# Patient Record
Sex: FEMALE | Race: WHITE | NOT HISPANIC OR LATINO
[De-identification: names, ages, dates, MRNs, and addresses within clinical notes are randomized per-mention and may not be internally consistent; named-entity substitution may affect disease eponyms.]

---

## 2018-09-20 ENCOUNTER — CHARTING TRANS (OUTPATIENT)
Dept: OTHER | Age: 53
End: 2018-09-20

## 2018-09-20 ASSESSMENT — PAIN SCALES - GENERAL: PAINLEVEL_OUTOF10: 4

## 2018-12-08 VITALS
OXYGEN SATURATION: 97 % | WEIGHT: 150 LBS | TEMPERATURE: 98.4 F | HEART RATE: 78 BPM | DIASTOLIC BLOOD PRESSURE: 70 MMHG | HEIGHT: 68 IN | SYSTOLIC BLOOD PRESSURE: 120 MMHG | RESPIRATION RATE: 14 BRPM | BODY MASS INDEX: 22.73 KG/M2

## 2019-02-19 ENCOUNTER — HOSPITAL ENCOUNTER (EMERGENCY)
Facility: HOSPITAL | Age: 54
Discharge: HOME | End: 2019-02-19
Attending: EMERGENCY MEDICINE
Payer: COMMERCIAL

## 2019-02-19 ENCOUNTER — APPOINTMENT (OUTPATIENT)
Dept: GENERAL RADIOLOGY | Facility: HOSPITAL | Age: 54
End: 2019-02-19
Attending: EMERGENCY MEDICINE
Payer: COMMERCIAL

## 2019-02-19 VITALS
RESPIRATION RATE: 17 BRPM | SYSTOLIC BLOOD PRESSURE: 94 MMHG | DIASTOLIC BLOOD PRESSURE: 60 MMHG | TEMPERATURE: 98 F | HEIGHT: 68 IN | OXYGEN SATURATION: 97 % | WEIGHT: 156 LBS | HEART RATE: 66 BPM | BODY MASS INDEX: 23.64 KG/M2

## 2019-02-19 DIAGNOSIS — R07.89 CHEST WALL PAIN: Primary | ICD-10-CM

## 2019-02-19 LAB
ALBUMIN SERPL-MCNC: 4 G/DL (ref 3.4–5)
ALBUMIN/GLOB SERPL: 1.3 {RATIO} (ref 1–2)
ALP LIVER SERPL-CCNC: 111 U/L (ref 41–108)
ALT SERPL-CCNC: 15 U/L (ref 13–56)
ANION GAP SERPL CALC-SCNC: 7 MMOL/L (ref 0–18)
AST SERPL-CCNC: 18 U/L (ref 15–37)
BASOPHILS # BLD AUTO: 0.06 X10(3) UL (ref 0–0.2)
BASOPHILS NFR BLD AUTO: 0.8 %
BILIRUB SERPL-MCNC: 0.2 MG/DL (ref 0.1–2)
BUN BLD-MCNC: 13 MG/DL (ref 7–18)
BUN/CREAT SERPL: 14.9 (ref 10–20)
CALCIUM BLD-MCNC: 8.8 MG/DL (ref 8.5–10.1)
CHLORIDE SERPL-SCNC: 110 MMOL/L (ref 98–107)
CO2 SERPL-SCNC: 26 MMOL/L (ref 21–32)
CREAT BLD-MCNC: 0.87 MG/DL (ref 0.55–1.02)
D-DIMER: 0.31 UG/ML FEU (ref 0–0.49)
DEPRECATED RDW RBC AUTO: 40.1 FL (ref 35.1–46.3)
EOSINOPHIL # BLD AUTO: 0.35 X10(3) UL (ref 0–0.7)
EOSINOPHIL NFR BLD AUTO: 4.5 %
ERYTHROCYTE [DISTWIDTH] IN BLOOD BY AUTOMATED COUNT: 12.1 % (ref 11–15)
GLOBULIN PLAS-MCNC: 3.1 G/DL (ref 2.8–4.4)
GLUCOSE BLD-MCNC: 89 MG/DL (ref 70–99)
HCT VFR BLD AUTO: 38.8 % (ref 35–48)
HGB BLD-MCNC: 12.8 G/DL (ref 12–16)
IMM GRANULOCYTES # BLD AUTO: 0.02 X10(3) UL (ref 0–1)
IMM GRANULOCYTES NFR BLD: 0.3 %
LYMPHOCYTES # BLD AUTO: 3.19 X10(3) UL (ref 1–4)
LYMPHOCYTES NFR BLD AUTO: 41.1 %
M PROTEIN MFR SERPL ELPH: 7.1 G/DL (ref 6.4–8.2)
MCH RBC QN AUTO: 29.8 PG (ref 26–34)
MCHC RBC AUTO-ENTMCNC: 33 G/DL (ref 31–37)
MCV RBC AUTO: 90.4 FL (ref 80–100)
MONOCYTES # BLD AUTO: 0.73 X10(3) UL (ref 0.1–1)
MONOCYTES NFR BLD AUTO: 9.4 %
NEUTROPHILS # BLD AUTO: 3.41 X10 (3) UL (ref 1.5–7.7)
NEUTROPHILS # BLD AUTO: 3.41 X10(3) UL (ref 1.5–7.7)
NEUTROPHILS NFR BLD AUTO: 43.9 %
OSMOLALITY SERPL CALC.SUM OF ELEC: 296 MOSM/KG (ref 275–295)
PLATELET # BLD AUTO: 245 10(3)UL (ref 150–450)
POTASSIUM SERPL-SCNC: 4.1 MMOL/L (ref 3.5–5.1)
RBC # BLD AUTO: 4.29 X10(6)UL (ref 3.8–5.3)
SODIUM SERPL-SCNC: 143 MMOL/L (ref 136–145)
TROPONIN I SERPL-MCNC: <0.045 NG/ML (ref ?–0.04)
TROPONIN I SERPL-MCNC: <0.045 NG/ML (ref ?–0.04)
WBC # BLD AUTO: 7.8 X10(3) UL (ref 4–11)

## 2019-02-19 PROCEDURE — 99285 EMERGENCY DEPT VISIT HI MDM: CPT

## 2019-02-19 PROCEDURE — 93010 ELECTROCARDIOGRAM REPORT: CPT

## 2019-02-19 PROCEDURE — 96374 THER/PROPH/DIAG INJ IV PUSH: CPT

## 2019-02-19 PROCEDURE — 93005 ELECTROCARDIOGRAM TRACING: CPT

## 2019-02-19 PROCEDURE — 85378 FIBRIN DEGRADE SEMIQUANT: CPT | Performed by: EMERGENCY MEDICINE

## 2019-02-19 PROCEDURE — 84484 ASSAY OF TROPONIN QUANT: CPT | Performed by: EMERGENCY MEDICINE

## 2019-02-19 PROCEDURE — 85025 COMPLETE CBC W/AUTO DIFF WBC: CPT

## 2019-02-19 PROCEDURE — 85025 COMPLETE CBC W/AUTO DIFF WBC: CPT | Performed by: EMERGENCY MEDICINE

## 2019-02-19 PROCEDURE — 71045 X-RAY EXAM CHEST 1 VIEW: CPT | Performed by: EMERGENCY MEDICINE

## 2019-02-19 PROCEDURE — 80053 COMPREHEN METABOLIC PANEL: CPT | Performed by: EMERGENCY MEDICINE

## 2019-02-19 PROCEDURE — 80053 COMPREHEN METABOLIC PANEL: CPT

## 2019-02-19 PROCEDURE — 84484 ASSAY OF TROPONIN QUANT: CPT

## 2019-02-19 RX ORDER — KETOROLAC TROMETHAMINE 30 MG/ML
30 INJECTION, SOLUTION INTRAMUSCULAR; INTRAVENOUS ONCE
Status: COMPLETED | OUTPATIENT
Start: 2019-02-19 | End: 2019-02-19

## 2019-02-19 RX ORDER — IBUPROFEN 600 MG/1
600 TABLET ORAL EVERY 8 HOURS PRN
Qty: 30 TABLET | Refills: 0 | Status: SHIPPED | OUTPATIENT
Start: 2019-02-19 | End: 2019-02-26

## 2019-02-19 NOTE — ED PROVIDER NOTES
Patient Seen in: [facility] Emergency Department    History   Patient presents with:  Chest Pain Angina (cardiovascular)    Stated Complaint: chest pain - started Thursday    HPI    56-year-old female comes to the hospital complaint having chest ron urinary sling   • OTHER SURGICAL HISTORY      laprascopy x 15 (endometriosis   • OTHER SURGICAL HISTORY      pilonidal cyst removal   • TONSILLECTOMY     • TOTAL ABDOM HYSTERECTOMY             Social History    Tobacco Use      Smoking status: Current Ever to contribute to the exclusion of venous thromboembolism with a negative predictive value of approximately 95% when results are used as part of the total clinical evaluation of the patient.    TROPONIN I - Normal   CBC WITH DIFFERENTIAL WITH PLATELET    Alex susceptibility on the axial T2* sequence. No evidence for diffusion restriction. No abnormal postcontrast enhancement. Mild mucosal thickening within the frontal sinus, ethmoid air cells and maxillary sinuses.  No significant fluid signal within the tempor 45547  [phone]    Schedule an appointment as soon as possible for a visit in 2 days          Medications Prescribed:  Current Discharge Medication List    START taking these medications    ibuprofen 600 MG Oral Tab  Take 1 tablet (600 mg total) by bette

## 2019-02-20 LAB
ATRIAL RATE: 79 BPM
P AXIS: 75 DEGREES
P-R INTERVAL: 160 MS
Q-T INTERVAL: 392 MS
QRS DURATION: 70 MS
QTC CALCULATION (BEZET): 449 MS
R AXIS: 79 DEGREES
T AXIS: 72 DEGREES
VENTRICULAR RATE: 79 BPM

## 2019-07-02 ENCOUNTER — HOSPITAL ENCOUNTER (OUTPATIENT)
Dept: MAMMOGRAPHY | Age: 54
Discharge: HOME | End: 2019-07-02
Attending: INTERNAL MEDICINE
Payer: COMMERCIAL

## 2019-07-02 DIAGNOSIS — Z12.31 ENCOUNTER FOR SCREENING MAMMOGRAM FOR MALIGNANT NEOPLASM OF BREAST: ICD-10-CM

## 2019-07-02 PROCEDURE — 77067 SCR MAMMO BI INCL CAD: CPT | Performed by: INTERNAL MEDICINE

## 2019-07-02 PROCEDURE — 77063 BREAST TOMOSYNTHESIS BI: CPT | Performed by: INTERNAL MEDICINE

## 2019-07-13 ENCOUNTER — HOSPITAL ENCOUNTER (OUTPATIENT)
Dept: GENERAL RADIOLOGY | Facility: HOSPITAL | Age: 54
Discharge: HOME | End: 2019-07-13
Attending: NURSE PRACTITIONER
Payer: COMMERCIAL

## 2019-07-13 DIAGNOSIS — R13.19 ESOPHAGEAL DYSPHAGIA: ICD-10-CM

## 2019-07-13 DIAGNOSIS — R19.8 GLOBUS SENSATION: ICD-10-CM

## 2019-07-13 PROCEDURE — 74246 X-RAY XM UPR GI TRC 2CNTRST: CPT | Performed by: NURSE PRACTITIONER

## 2019-09-27 ENCOUNTER — HOSPITAL ENCOUNTER (OUTPATIENT)
Dept: GENERAL RADIOLOGY | Age: 54
Discharge: HOME | End: 2019-09-27
Attending: NURSE PRACTITIONER
Payer: COMMERCIAL

## 2019-09-27 DIAGNOSIS — R20.2 PARESTHESIA OF SKIN: ICD-10-CM

## 2019-09-27 DIAGNOSIS — R20.2 PARESTHESIA: ICD-10-CM

## 2019-09-27 DIAGNOSIS — M54.42 LUMBAGO WITH SCIATICA, LEFT SIDE: ICD-10-CM

## 2019-09-27 DIAGNOSIS — R53.1 WEAKNESS: ICD-10-CM

## 2019-09-27 DIAGNOSIS — M54.42 ACUTE BACK PAIN WITH SCIATICA, LEFT: ICD-10-CM

## 2019-09-27 PROCEDURE — 72202 X-RAY EXAM SI JOINTS 3/> VWS: CPT | Performed by: NURSE PRACTITIONER

## 2019-09-27 PROCEDURE — 72072 X-RAY EXAM THORAC SPINE 3VWS: CPT | Performed by: NURSE PRACTITIONER

## 2019-09-27 PROCEDURE — 72040 X-RAY EXAM NECK SPINE 2-3 VW: CPT | Performed by: NURSE PRACTITIONER

## 2019-09-27 PROCEDURE — 72100 X-RAY EXAM L-S SPINE 2/3 VWS: CPT | Performed by: NURSE PRACTITIONER

## 2019-10-14 ENCOUNTER — HOSPITAL ENCOUNTER (OUTPATIENT)
Age: 54
Discharge: HOME | End: 2019-10-14
Payer: COMMERCIAL

## 2019-10-14 ENCOUNTER — APPOINTMENT (OUTPATIENT)
Dept: GENERAL RADIOLOGY | Age: 54
End: 2019-10-14
Attending: NURSE PRACTITIONER
Payer: COMMERCIAL

## 2019-10-14 VITALS
HEIGHT: 68 IN | HEART RATE: 79 BPM | OXYGEN SATURATION: 98 % | BODY MASS INDEX: 25.76 KG/M2 | RESPIRATION RATE: 18 BRPM | SYSTOLIC BLOOD PRESSURE: 109 MMHG | WEIGHT: 170 LBS | TEMPERATURE: 98 F | DIASTOLIC BLOOD PRESSURE: 66 MMHG

## 2019-10-14 DIAGNOSIS — S60.031A CONTUSION OF RIGHT MIDDLE FINGER WITHOUT DAMAGE TO NAIL, INITIAL ENCOUNTER: Primary | ICD-10-CM

## 2019-10-14 PROCEDURE — 99213 OFFICE O/P EST LOW 20 MIN: CPT

## 2019-10-14 PROCEDURE — 73140 X-RAY EXAM OF FINGER(S): CPT | Performed by: NURSE PRACTITIONER

## 2019-10-14 NOTE — ED PROVIDER NOTES
Patient Seen in: [facility]      History   Patient presents with:  Finger Injury    Stated Complaint: right hand middle finger x 1 week     56-year-old female presents today with injury to the distal aspect of the right middle f Anshul Perkins MD at [facility] MAIN OR   • COLONOSCOPY  2001   • COLONOSCOPY  11/16    fair prep- repeat 3 yrs   • COLONOSCOPY N/A 11/2/2016    Performed by Shasha Yi MD at [facility] ENDOSCOPY   • HYSTERECTOMY     • ARIANE LOCALIZATION WIRE 1 SITE RIGHT (CP Decreased flexion at the joint due to pain. Skin:     General: Skin is warm and dry. Neurological:      Mental Status: She is alert and oriented to person, place, and time.                ED Course   Labs Reviewed - No data to display      Xr Finger(s)

## 2019-10-14 NOTE — ED INITIAL ASSESSMENT (HPI)
Pt crushed her middle rt finger in her car while adjusting things in her trunk 8 days ago.   She bought a splint, but pain continues, and she is concerned

## 2019-11-06 ENCOUNTER — APPOINTMENT (OUTPATIENT)
Dept: ULTRASOUND IMAGING | Facility: HOSPITAL | Age: 54
End: 2019-11-06
Attending: PHYSICIAN ASSISTANT
Payer: COMMERCIAL

## 2019-11-06 ENCOUNTER — HOSPITAL ENCOUNTER (EMERGENCY)
Facility: HOSPITAL | Age: 54
Discharge: HOME | End: 2019-11-06
Attending: EMERGENCY MEDICINE
Payer: COMMERCIAL

## 2019-11-06 VITALS
OXYGEN SATURATION: 97 % | HEART RATE: 70 BPM | TEMPERATURE: 98 F | SYSTOLIC BLOOD PRESSURE: 103 MMHG | RESPIRATION RATE: 18 BRPM | WEIGHT: 170 LBS | BODY MASS INDEX: 26 KG/M2 | DIASTOLIC BLOOD PRESSURE: 59 MMHG

## 2019-11-06 DIAGNOSIS — S39.011A STRAIN OF ABDOMINAL WALL, INITIAL ENCOUNTER: Primary | ICD-10-CM

## 2019-11-06 PROCEDURE — 83690 ASSAY OF LIPASE: CPT | Performed by: PHYSICIAN ASSISTANT

## 2019-11-06 PROCEDURE — 93010 ELECTROCARDIOGRAM REPORT: CPT | Performed by: PHYSICIAN ASSISTANT

## 2019-11-06 PROCEDURE — 96361 HYDRATE IV INFUSION ADD-ON: CPT

## 2019-11-06 PROCEDURE — 99285 EMERGENCY DEPT VISIT HI MDM: CPT

## 2019-11-06 PROCEDURE — 93005 ELECTROCARDIOGRAM TRACING: CPT

## 2019-11-06 PROCEDURE — 85025 COMPLETE CBC W/AUTO DIFF WBC: CPT | Performed by: PHYSICIAN ASSISTANT

## 2019-11-06 PROCEDURE — 80053 COMPREHEN METABOLIC PANEL: CPT | Performed by: PHYSICIAN ASSISTANT

## 2019-11-06 PROCEDURE — 76700 US EXAM ABDOM COMPLETE: CPT | Performed by: PHYSICIAN ASSISTANT

## 2019-11-06 PROCEDURE — 96360 HYDRATION IV INFUSION INIT: CPT

## 2019-11-06 RX ORDER — NAPROXEN 500 MG/1
500 TABLET ORAL 2 TIMES DAILY PRN
Qty: 20 TABLET | Refills: 0 | Status: SHIPPED | OUTPATIENT
Start: 2019-11-06 | End: 2019-11-13

## 2019-11-06 RX ORDER — LIDOCAINE HYDROCHLORIDE 20 MG/ML
10 SOLUTION OROPHARYNGEAL ONCE
Status: COMPLETED | OUTPATIENT
Start: 2019-11-06 | End: 2019-11-06

## 2019-11-06 RX ORDER — CYCLOBENZAPRINE HCL 10 MG
10 TABLET ORAL 3 TIMES DAILY PRN
Qty: 20 TABLET | Refills: 0 | Status: SHIPPED | OUTPATIENT
Start: 2019-11-06 | End: 2019-11-13

## 2019-11-06 RX ORDER — MAGNESIUM HYDROXIDE/ALUMINUM HYDROXICE/SIMETHICONE 120; 1200; 1200 MG/30ML; MG/30ML; MG/30ML
30 SUSPENSION ORAL ONCE
Status: COMPLETED | OUTPATIENT
Start: 2019-11-06 | End: 2019-11-06

## 2019-11-06 NOTE — ED INITIAL ASSESSMENT (HPI)
Upper abdominal pain for 4 days. Started after lifting something heavy. Also having diarrhea. Denies nausea.

## 2019-11-06 NOTE — ED PROVIDER NOTES
61-year-old female presents to the emergency department for evaluation of epigastric pain that has been ongoing after she did some lifting to clean up her garden a few days ago.   Patient states that the pain is mild in the morning and then progresses throu

## 2019-11-06 NOTE — ED PROVIDER NOTES
Patient Seen in: [facility] Emergency Department      History   Patient presents with:  Abdomen/Flank Pain (GI/)  Nausea/Vomiting/Diarrhea (gastrointestinal)    Stated Complaint: abd pain and diarrhea     HPI    Patient is a pleasant 25-year-old f infection    • Vertigo    • Weight gain               Past Surgical History:   Procedure Laterality Date   • ADENOIDECTOMY     • BLADDER TRANSVAGINAL TAPING SUSPENSION URETHROLYSIS N/A 5/24/2016    Performed by Pallavi Banda MD at [facility] OR   • COL stridor to auscultation  Lung: No distress, RR, no retraction, breath sounds are clear bilaterally  Cardio: Regular rate and rhythm, normal S1-S2, no murmur appreciable  Extremities: Full ROM, no deformity, NVI  Back: Full range of motion, no CVA tendernes PIPJ pain since the incident. No previous hx of fractures or surgeries          CONCLUSION:  Mild DIP joint space narrowing. No fracture dislocation. Mild soft tissue swelling right 3rd digit.   Small subchondral cysts within the base of the middle phalan Fluid bolus. CBC, CMP, lipase. Abdominal ultrasound. Clinical concern for possible new hiatal hernia. Normal CBC, CMP and lipase    CONCLUSION:  1. Contracted gallbladder with multiple polyps versus stones.   Suggest follow-up ultrasound in non fastin

## 2019-12-10 ENCOUNTER — HOSPITAL ENCOUNTER (OUTPATIENT)
Dept: NUCLEAR MEDICINE | Facility: HOSPITAL | Age: 54
Discharge: HOME | End: 2019-12-10
Attending: NURSE PRACTITIONER
Payer: COMMERCIAL

## 2019-12-10 DIAGNOSIS — R14.3 FLATULENCE: ICD-10-CM

## 2019-12-10 DIAGNOSIS — R10.13 EPIGASTRIC PAIN: ICD-10-CM

## 2019-12-10 DIAGNOSIS — R63.0 DECREASED APPETITE: ICD-10-CM

## 2019-12-10 DIAGNOSIS — R19.8 ABDOMINAL FULLNESS: ICD-10-CM

## 2019-12-10 PROCEDURE — 78227 HEPATOBIL SYST IMAGE W/DRUG: CPT | Performed by: NURSE PRACTITIONER

## 2019-12-17 ENCOUNTER — OFFICE VISIT (OUTPATIENT)
Dept: SURGERY | Facility: CLINIC | Age: 54
End: 2019-12-17
Payer: COMMERCIAL

## 2019-12-17 VITALS — HEART RATE: 79 BPM | SYSTOLIC BLOOD PRESSURE: 103 MMHG | DIASTOLIC BLOOD PRESSURE: 69 MMHG | TEMPERATURE: 98 F

## 2019-12-17 DIAGNOSIS — K80.12 CALCULUS OF GALLBLADDER WITH ACUTE ON CHRONIC CHOLECYSTITIS WITHOUT OBSTRUCTION: ICD-10-CM

## 2019-12-17 DIAGNOSIS — K81.0 ACUTE CHOLECYSTITIS: Primary | ICD-10-CM

## 2019-12-17 PROCEDURE — 99244 OFF/OP CNSLTJ NEW/EST MOD 40: CPT | Performed by: SURGERY

## 2019-12-18 ENCOUNTER — TELEPHONE (OUTPATIENT)
Dept: SURGERY | Facility: CLINIC | Age: 54
End: 2019-12-18

## 2019-12-18 ENCOUNTER — ANESTHESIA EVENT (OUTPATIENT)
Dept: SURGERY | Facility: HOSPITAL | Age: 54
DRG: 419 | End: 2019-12-18
Payer: COMMERCIAL

## 2019-12-18 ENCOUNTER — ANESTHESIA (OUTPATIENT)
Dept: SURGERY | Facility: HOSPITAL | Age: 54
DRG: 419 | End: 2019-12-18
Payer: COMMERCIAL

## 2019-12-18 ENCOUNTER — HOSPITAL ENCOUNTER (INPATIENT)
Facility: HOSPITAL | Age: 54
LOS: 1 days | Discharge: HOME | DRG: 419 | End: 2019-12-19
Attending: SURGERY | Admitting: SURGERY
Payer: COMMERCIAL

## 2019-12-18 ENCOUNTER — APPOINTMENT (OUTPATIENT)
Dept: GENERAL RADIOLOGY | Facility: HOSPITAL | Age: 54
DRG: 419 | End: 2019-12-18
Attending: SURGERY
Payer: COMMERCIAL

## 2019-12-18 DIAGNOSIS — K81.0 ACUTE CHOLECYSTITIS: ICD-10-CM

## 2019-12-18 PROCEDURE — BF10YZZ FLUOROSCOPY OF BILE DUCTS USING OTHER CONTRAST: ICD-10-PCS | Performed by: SURGERY

## 2019-12-18 PROCEDURE — 74300 X-RAY BILE DUCTS/PANCREAS: CPT | Performed by: SURGERY

## 2019-12-18 PROCEDURE — 0FT44ZZ RESECTION OF GALLBLADDER, PERCUTANEOUS ENDOSCOPIC APPROACH: ICD-10-PCS | Performed by: SURGERY

## 2019-12-18 RX ORDER — ACETAMINOPHEN 325 MG/1
650 TABLET ORAL EVERY 4 HOURS PRN
Status: DISCONTINUED | OUTPATIENT
Start: 2019-12-18 | End: 2019-12-19

## 2019-12-18 RX ORDER — ACETAMINOPHEN 10 MG/ML
INJECTION, SOLUTION INTRAVENOUS
Status: COMPLETED
Start: 2019-12-18 | End: 2019-12-18

## 2019-12-18 RX ORDER — MEPERIDINE HYDROCHLORIDE 25 MG/ML
INJECTION INTRAMUSCULAR; INTRAVENOUS; SUBCUTANEOUS
Status: COMPLETED
Start: 2019-12-18 | End: 2019-12-18

## 2019-12-18 RX ORDER — HYDROCODONE BITARTRATE AND ACETAMINOPHEN 5; 325 MG/1; MG/1
1 TABLET ORAL AS NEEDED
Status: DISCONTINUED | OUTPATIENT
Start: 2019-12-18 | End: 2019-12-18 | Stop reason: HOSPADM

## 2019-12-18 RX ORDER — ONDANSETRON 2 MG/ML
4 INJECTION INTRAMUSCULAR; INTRAVENOUS AS NEEDED
Status: DISCONTINUED | OUTPATIENT
Start: 2019-12-18 | End: 2019-12-18 | Stop reason: HOSPADM

## 2019-12-18 RX ORDER — SODIUM CHLORIDE, SODIUM LACTATE, POTASSIUM CHLORIDE, CALCIUM CHLORIDE 600; 310; 30; 20 MG/100ML; MG/100ML; MG/100ML; MG/100ML
INJECTION, SOLUTION INTRAVENOUS CONTINUOUS
Status: DISCONTINUED | OUTPATIENT
Start: 2019-12-18 | End: 2019-12-18 | Stop reason: HOSPADM

## 2019-12-18 RX ORDER — LIDOCAINE HYDROCHLORIDE 10 MG/ML
INJECTION, SOLUTION EPIDURAL; INFILTRATION; INTRACAUDAL; PERINEURAL AS NEEDED
Status: DISCONTINUED | OUTPATIENT
Start: 2019-12-18 | End: 2019-12-18 | Stop reason: SURG

## 2019-12-18 RX ORDER — BISACODYL 10 MG
10 SUPPOSITORY, RECTAL RECTAL
Status: DISCONTINUED | OUTPATIENT
Start: 2019-12-18 | End: 2019-12-19

## 2019-12-18 RX ORDER — DIPHENHYDRAMINE HYDROCHLORIDE 50 MG/ML
12.5 INJECTION INTRAMUSCULAR; INTRAVENOUS AS NEEDED
Status: DISCONTINUED | OUTPATIENT
Start: 2019-12-18 | End: 2019-12-18 | Stop reason: HOSPADM

## 2019-12-18 RX ORDER — ONDANSETRON 2 MG/ML
4 INJECTION INTRAMUSCULAR; INTRAVENOUS EVERY 6 HOURS PRN
Status: DISCONTINUED | OUTPATIENT
Start: 2019-12-18 | End: 2019-12-19

## 2019-12-18 RX ORDER — POLYETHYLENE GLYCOL 3350 17 G/17G
17 POWDER, FOR SOLUTION ORAL DAILY PRN
Status: DISCONTINUED | OUTPATIENT
Start: 2019-12-18 | End: 2019-12-19

## 2019-12-18 RX ORDER — DEXAMETHASONE SODIUM PHOSPHATE 4 MG/ML
VIAL (ML) INJECTION AS NEEDED
Status: DISCONTINUED | OUTPATIENT
Start: 2019-12-18 | End: 2019-12-18 | Stop reason: SURG

## 2019-12-18 RX ORDER — NEOSTIGMINE METHYLSULFATE 1 MG/ML
INJECTION INTRAVENOUS AS NEEDED
Status: DISCONTINUED | OUTPATIENT
Start: 2019-12-18 | End: 2019-12-18 | Stop reason: SURG

## 2019-12-18 RX ORDER — MEPERIDINE HYDROCHLORIDE 25 MG/ML
12.5 INJECTION INTRAMUSCULAR; INTRAVENOUS; SUBCUTANEOUS AS NEEDED
Status: COMPLETED | OUTPATIENT
Start: 2019-12-18 | End: 2019-12-18

## 2019-12-18 RX ORDER — ACETAMINOPHEN 10 MG/ML
1000 INJECTION, SOLUTION INTRAVENOUS ONCE
Status: COMPLETED | OUTPATIENT
Start: 2019-12-18 | End: 2019-12-18

## 2019-12-18 RX ORDER — HYDROCODONE BITARTRATE AND ACETAMINOPHEN 5; 325 MG/1; MG/1
1 TABLET ORAL EVERY 6 HOURS PRN
Qty: 10 TABLET | Refills: 0 | Status: SHIPPED | OUTPATIENT
Start: 2019-12-18 | End: 2020-08-25 | Stop reason: ALTCHOICE

## 2019-12-18 RX ORDER — KETOROLAC TROMETHAMINE 30 MG/ML
INJECTION, SOLUTION INTRAMUSCULAR; INTRAVENOUS AS NEEDED
Status: DISCONTINUED | OUTPATIENT
Start: 2019-12-18 | End: 2019-12-18 | Stop reason: SURG

## 2019-12-18 RX ORDER — HYDROCODONE BITARTRATE AND ACETAMINOPHEN 5; 325 MG/1; MG/1
2 TABLET ORAL AS NEEDED
Status: DISCONTINUED | OUTPATIENT
Start: 2019-12-18 | End: 2019-12-18 | Stop reason: HOSPADM

## 2019-12-18 RX ORDER — GLYCOPYRROLATE 0.2 MG/ML
INJECTION, SOLUTION INTRAMUSCULAR; INTRAVENOUS AS NEEDED
Status: DISCONTINUED | OUTPATIENT
Start: 2019-12-18 | End: 2019-12-18 | Stop reason: SURG

## 2019-12-18 RX ORDER — HEPARIN SODIUM 5000 [USP'U]/ML
5000 INJECTION, SOLUTION INTRAVENOUS; SUBCUTANEOUS EVERY 8 HOURS SCHEDULED
Status: DISCONTINUED | OUTPATIENT
Start: 2019-12-19 | End: 2019-12-19

## 2019-12-18 RX ORDER — NALOXONE HYDROCHLORIDE 0.4 MG/ML
80 INJECTION, SOLUTION INTRAMUSCULAR; INTRAVENOUS; SUBCUTANEOUS AS NEEDED
Status: DISCONTINUED | OUTPATIENT
Start: 2019-12-18 | End: 2019-12-18 | Stop reason: HOSPADM

## 2019-12-18 RX ORDER — MORPHINE SULFATE 4 MG/ML
2 INJECTION, SOLUTION INTRAMUSCULAR; INTRAVENOUS EVERY 2 HOUR PRN
Status: DISCONTINUED | OUTPATIENT
Start: 2019-12-18 | End: 2019-12-19

## 2019-12-18 RX ORDER — MORPHINE SULFATE 4 MG/ML
2 INJECTION, SOLUTION INTRAMUSCULAR; INTRAVENOUS EVERY 10 MIN PRN
Status: DISCONTINUED | OUTPATIENT
Start: 2019-12-18 | End: 2019-12-18 | Stop reason: HOSPADM

## 2019-12-18 RX ORDER — ONDANSETRON 2 MG/ML
INJECTION INTRAMUSCULAR; INTRAVENOUS AS NEEDED
Status: DISCONTINUED | OUTPATIENT
Start: 2019-12-18 | End: 2019-12-18 | Stop reason: SURG

## 2019-12-18 RX ORDER — MORPHINE SULFATE 4 MG/ML
1 INJECTION, SOLUTION INTRAMUSCULAR; INTRAVENOUS EVERY 2 HOUR PRN
Status: DISCONTINUED | OUTPATIENT
Start: 2019-12-18 | End: 2019-12-19

## 2019-12-18 RX ORDER — MIDAZOLAM HYDROCHLORIDE 1 MG/ML
1 INJECTION INTRAMUSCULAR; INTRAVENOUS EVERY 5 MIN PRN
Status: DISCONTINUED | OUTPATIENT
Start: 2019-12-18 | End: 2019-12-18 | Stop reason: HOSPADM

## 2019-12-18 RX ORDER — MORPHINE SULFATE 4 MG/ML
INJECTION, SOLUTION INTRAMUSCULAR; INTRAVENOUS
Status: COMPLETED
Start: 2019-12-18 | End: 2019-12-18

## 2019-12-18 RX ORDER — ACETAMINOPHEN 325 MG/1
325 TABLET ORAL EVERY 6 HOURS PRN
COMMUNITY
End: 2020-09-29 | Stop reason: ALTCHOICE

## 2019-12-18 RX ORDER — ROCURONIUM BROMIDE 10 MG/ML
INJECTION, SOLUTION INTRAVENOUS AS NEEDED
Status: DISCONTINUED | OUTPATIENT
Start: 2019-12-18 | End: 2019-12-18 | Stop reason: SURG

## 2019-12-18 RX ORDER — MORPHINE SULFATE 4 MG/ML
4 INJECTION, SOLUTION INTRAMUSCULAR; INTRAVENOUS EVERY 2 HOUR PRN
Status: DISCONTINUED | OUTPATIENT
Start: 2019-12-18 | End: 2019-12-19

## 2019-12-18 RX ORDER — DOCUSATE SODIUM 100 MG/1
100 CAPSULE, LIQUID FILLED ORAL 2 TIMES DAILY
Status: DISCONTINUED | OUTPATIENT
Start: 2019-12-18 | End: 2019-12-19

## 2019-12-18 RX ORDER — HYDROCODONE BITARTRATE AND ACETAMINOPHEN 5; 325 MG/1; MG/1
1 TABLET ORAL EVERY 4 HOURS PRN
Status: DISCONTINUED | OUTPATIENT
Start: 2019-12-18 | End: 2019-12-19

## 2019-12-18 RX ORDER — MIDAZOLAM HYDROCHLORIDE 1 MG/ML
INJECTION INTRAMUSCULAR; INTRAVENOUS
Status: COMPLETED
Start: 2019-12-18 | End: 2019-12-18

## 2019-12-18 RX ORDER — ACETAMINOPHEN 500 MG
1000 TABLET ORAL ONCE
Status: COMPLETED | OUTPATIENT
Start: 2019-12-18 | End: 2019-12-18

## 2019-12-18 RX ORDER — HEPARIN SODIUM 5000 [USP'U]/ML
5000 INJECTION, SOLUTION INTRAVENOUS; SUBCUTANEOUS ONCE
Status: DISCONTINUED | OUTPATIENT
Start: 2019-12-18 | End: 2019-12-18 | Stop reason: HOSPADM

## 2019-12-18 RX ORDER — BUPIVACAINE HYDROCHLORIDE AND EPINEPHRINE 5; 5 MG/ML; UG/ML
INJECTION, SOLUTION EPIDURAL; INTRACAUDAL; PERINEURAL AS NEEDED
Status: DISCONTINUED | OUTPATIENT
Start: 2019-12-18 | End: 2019-12-18 | Stop reason: HOSPADM

## 2019-12-18 RX ORDER — LABETALOL HYDROCHLORIDE 5 MG/ML
5 INJECTION, SOLUTION INTRAVENOUS EVERY 5 MIN PRN
Status: DISCONTINUED | OUTPATIENT
Start: 2019-12-18 | End: 2019-12-18 | Stop reason: HOSPADM

## 2019-12-18 RX ORDER — SODIUM PHOSPHATE, DIBASIC AND SODIUM PHOSPHATE, MONOBASIC 7; 19 G/133ML; G/133ML
1 ENEMA RECTAL ONCE AS NEEDED
Status: DISCONTINUED | OUTPATIENT
Start: 2019-12-18 | End: 2019-12-19

## 2019-12-18 RX ORDER — SCOLOPAMINE TRANSDERMAL SYSTEM 1 MG/1
1 PATCH, EXTENDED RELEASE TRANSDERMAL
Status: DISCONTINUED | OUTPATIENT
Start: 2019-12-18 | End: 2019-12-21 | Stop reason: HOSPADM

## 2019-12-18 RX ORDER — HYDROCODONE BITARTRATE AND ACETAMINOPHEN 5; 325 MG/1; MG/1
2 TABLET ORAL EVERY 4 HOURS PRN
Status: DISCONTINUED | OUTPATIENT
Start: 2019-12-18 | End: 2019-12-19

## 2019-12-18 RX ORDER — MORPHINE SULFATE 4 MG/ML
2 INJECTION, SOLUTION INTRAMUSCULAR; INTRAVENOUS EVERY 5 MIN PRN
Status: DISCONTINUED | OUTPATIENT
Start: 2019-12-18 | End: 2019-12-18 | Stop reason: HOSPADM

## 2019-12-18 RX ADMIN — LIDOCAINE HYDROCHLORIDE 50 MG: 10 INJECTION, SOLUTION EPIDURAL; INFILTRATION; INTRACAUDAL; PERINEURAL at 15:47:00

## 2019-12-18 RX ADMIN — SODIUM CHLORIDE, SODIUM LACTATE, POTASSIUM CHLORIDE, CALCIUM CHLORIDE: 600; 310; 30; 20 INJECTION, SOLUTION INTRAVENOUS at 15:55:00

## 2019-12-18 RX ADMIN — SODIUM CHLORIDE, SODIUM LACTATE, POTASSIUM CHLORIDE, CALCIUM CHLORIDE: 600; 310; 30; 20 INJECTION, SOLUTION INTRAVENOUS at 15:40:00

## 2019-12-18 RX ADMIN — ONDANSETRON 4 MG: 2 INJECTION INTRAMUSCULAR; INTRAVENOUS at 16:27:00

## 2019-12-18 RX ADMIN — DEXAMETHASONE SODIUM PHOSPHATE 4 MG: 4 MG/ML VIAL (ML) INJECTION at 15:53:00

## 2019-12-18 RX ADMIN — SODIUM CHLORIDE, SODIUM LACTATE, POTASSIUM CHLORIDE, CALCIUM CHLORIDE: 600; 310; 30; 20 INJECTION, SOLUTION INTRAVENOUS at 16:41:00

## 2019-12-18 RX ADMIN — KETOROLAC TROMETHAMINE 30 MG: 30 INJECTION, SOLUTION INTRAMUSCULAR; INTRAVENOUS at 16:30:00

## 2019-12-18 RX ADMIN — GLYCOPYRROLATE 0.4 MG: 0.2 INJECTION, SOLUTION INTRAMUSCULAR; INTRAVENOUS at 16:30:00

## 2019-12-18 RX ADMIN — ROCURONIUM BROMIDE 30 MG: 10 INJECTION, SOLUTION INTRAVENOUS at 15:46:00

## 2019-12-18 RX ADMIN — NEOSTIGMINE METHYLSULFATE 3 MG: 1 INJECTION INTRAVENOUS at 16:30:00

## 2019-12-18 NOTE — ANESTHESIA PROCEDURE NOTES
Airway  Urgency: elective      General Information and Staff    Patient location during procedure: OR  Anesthesiologist: Lori Carmichael MD  Performed: anesthesiologist     Indications and Patient Condition  Indications for airway management: anesthesia  Se

## 2019-12-18 NOTE — H&P (VIEW-ONLY)
New Patient Visit Note       Active Problems      1. Acute cholecystitis    2.  Calculus of gallbladder with acute on chronic cholecystitis without obstruction        Chief Complaint   Patient presents with:  Gallbladder: NP gallbladder consult, abdnormal H • ARIANE LOCALIZATION WIRE 1 SITE RIGHT (CPT=19281) Right 1982    Benign   • OTHER      urinary sling   • OTHER SURGICAL HISTORY      laprascopy x 15 (endometriosis   • OTHER SURGICAL HISTORY      pilonidal cyst removal   • TONSILLECTOMY     • TOTAL ABDOM H equal, round, and reactive to light. EOM are normal.   Cardiovascular: Normal rate and regular rhythm. Pulmonary/Chest: Effort normal and breath sounds normal.        Abdominal: Soft. Bowel sounds are normal. She exhibits no distension.  There is tenderne Surgery to be scheduled 12/18/2019         The risks, benefits, complications, possible outcomes and alternatives of the procedure were explained to the patient in detail.    Expected postoperative pain, recuperation and postoperative course was also review

## 2019-12-18 NOTE — BRIEF OP NOTE
Pre-Operative Diagnosis: biliary diskinesia, gallbladder polyps , acute acalculous cholecystitis     Post-Operative Diagnosis: same, negative cholangiogram       Procedure Performed:   Procedure(s):  Laparoscopic Cholecystectomy with cholangiogram     Surg

## 2019-12-18 NOTE — OPERATIVE REPORT
[facility]   Operative Note    Donivan Speak Location: OR   [facility] [id number] MRN [id number]   Admission Date 12/18/2019 Operation Date 12/18/2019   Attending Physician Antonia Avila MD Operating Physician Darshan Urbina MD     Date of procedure: seroma/hematoma formation, postoperative wound complications including development of a hernia, trocar injury, intra-abdominal organ injury, bile leakage, biloma formation, common bile duct injury, conversion to an open procedure, inability to complete the Dissection in the cystic duct-gallbladder junction was performed to define the cystic duct for sufficient length. Dissection was kept above the line of Rouviere and a critical view was obtained.   The cystic duct was then clipped once proximally on the spe subcuticular manner. All sponge, instrument and needle counts were correct at the conclusion of the procedure. The patient did tolerate the procedure well. The patient was taken to the recovery room in stable condition.     Complications:  None    EBL:

## 2019-12-18 NOTE — ANESTHESIA POSTPROCEDURE EVALUATION
[address] [facility],Third Floor Patient Status:  Outpatient in a Bed   Age/Gender 49year old female MRN [id number]   Location [facility] Attending Kemal Jeff MD   Hosp Day # 0 PCP Gabriella Wagoner MD       Anesthesia P

## 2019-12-18 NOTE — INTERVAL H&P NOTE
Pre-op Diagnosis: Acute cholecystitis [K81.0]    The above referenced H&P was reviewed by Fady yLon MD on 12/18/2019, the patient was examined and no significant changes have occurred in the patient's condition since the H&P was performed.   I discuss

## 2019-12-18 NOTE — ANESTHESIA PREPROCEDURE EVALUATION
PRE-OP EVALUATION    Patient Name: Nora Jon    Pre-op Diagnosis: Acute cholecystitis [K81.0]    Procedure(s):  Laparoscopic Cholecystectomy with cholangiogram     Surgeon(s) and Role:     Robin Wills MD - Primary    Pre-op vitals reviewed. • OTHER      urinary sling   • OTHER SURGICAL HISTORY      laprascopy x 15 (endometriosis   • OTHER SURGICAL HISTORY      pilonidal cyst removal   • TONSILLECTOMY     • TOTAL ABDOM HYSTERECTOMY       Social History    Tobacco Use      Smoking status: Cur

## 2019-12-19 VITALS
HEIGHT: 68 IN | TEMPERATURE: 98 F | SYSTOLIC BLOOD PRESSURE: 96 MMHG | RESPIRATION RATE: 18 BRPM | OXYGEN SATURATION: 100 % | HEART RATE: 63 BPM | DIASTOLIC BLOOD PRESSURE: 46 MMHG | WEIGHT: 161.38 LBS | BODY MASS INDEX: 24.46 KG/M2

## 2019-12-19 RX ORDER — SODIUM CHLORIDE 9 MG/ML
INJECTION, SOLUTION INTRAVENOUS ONCE
Status: COMPLETED | OUTPATIENT
Start: 2019-12-19 | End: 2019-12-19

## 2019-12-19 RX ORDER — SODIUM CHLORIDE 9 MG/ML
INJECTION, SOLUTION INTRAVENOUS CONTINUOUS
Status: DISCONTINUED | OUTPATIENT
Start: 2019-12-19 | End: 2019-12-19

## 2019-12-19 NOTE — PROGRESS NOTES
PT RESTING IN BED, EASY NON LABORED BREATHING ON RA. BILATERAL SCD'S IN PLACE. LAP SITES X4 WITH SS, EBLI SITE WITH GAUZE AND PAPER TAPE. C/D/I. VOIDS ADEQUATE AMOUNT. AM MEDS ADMIN PLAN OF CARE DISCUSSED. INSTRUCTED TO CALL IF ANY NEEDS ARISE.  CALL LIGHT

## 2019-12-19 NOTE — PAYOR COMM NOTE
--------------  ADMISSION REVIEW     Payor: RADHA SALEEM  Subscriber #:  [id number]  Authorization Number: [id number]    Admit date: 12/18/19  Admit time: 2036       Admitting Physician: Capri Chan MD  Attending Physician:  Capri Chan MD  Primary H&P (View-Only) signed by Aide Herrera MD at 12/18/2019  3:44 PM     Author: Aide Herrera, • Sleep disturbance     • Sputum production     • Stress     • Tooth infection     • Vertigo     • Weight gain              Past Surgical History:   Procedure Laterality Date   • ADENOIDECTOMY       • BLADDER TRANSVAGINAL TAPING SUSPENSION URETHROLYSIS N/A Skin: Negative for color change and rash. Neurological: Negative for dizziness, syncope and light-headedness. Hematological: Negative for adenopathy. Does not bruise/bleed easily.    Psychiatric/Behavioral: Negative for confusion, hallucinations and sle The recommendation is to proceed with lap cholecystectomy with IOC for biliary dyskinesia   The risks/benefits of surgery were reviewed and Benji Ortiz does not have any questions     She would like to proceed with surgery ASAP as she is having a significant tami Pre-Operative Diagnosis: biliary diskinesia, gallbladder polyps, acute acalculous cholecystitis      Indication for Surgery:  Abdominal pain     Post-Operative Diagnosis/Operative Findings: Same as above -negative IOC     Procedure performed:  Laparoscopic Discussed with patient:  The potential risks and benefits of the procedure were discussed in detail with the patient including but not limited to bleeding, infections, seroma/hematoma formation, postoperative wound complications including development of a A 5 mm epigastric port and two 5 mm lateral ports were placed under direct vision without incidence. The patient was placed into a reverse Trendelenburg position with the right side up. The gallbladder was grasped at the fundus and elevated superiorly. approximated and in good position. There was no evidence of bleeding or bile leakage from the liver bed. The accessory ports were removed under direct vision and there was no evidence of bleeding from the anterior abdominal wall.  The abdomen was then defl Incision: Clean, dry, intact, no erythema     Labs:        Lab Results   Component Value Date     HGB 12.7 12/19/2019     HCT 38.8 12/19/2019         No results found for: PT, INR     I/O last 3 completed shifts:   In: 1500 [I.V.:1500]  Out: 410 [Urine:400; Date Action Dose Route User    12/19/2019 0835 Given 100 mg Oral Shannan Carvalho, RN      fentaNYL citrate (SUBLIMAZE) 0.05 MG/ML injection     Date Action Dose Route User    12/18/2019 1616 Given 50 mcg Intravenous Jossie Salmeron MD    12/18/2019 1547 Caryle Shuck Date Action Dose Route User    12/18/2019 1838 Given 0.5 mg Intravenous Alvera ENE Riggs    12/18/2019 1815 Given 0.5 mg Intravenous Alvera ENE Riggs    12/18/2019 1718 Given 0.5 mg Intravenous Alvera ENE Riggs    12/18/2019 1708 Given 0.5 12/19/2019 1130 New Bag (none) Intravenous Rodrigo Gaviria RN      sodium chloride 0.9% IV bolus 500 mL     Date Action Dose Route User    12/19/2019 0045 New Bag 500 mL Intravenous Marilee Durant RN      sodium chloride 0.9% IV bolus 500 mL     Arturo

## 2019-12-19 NOTE — PROGRESS NOTES
[facility]  Progress Note    Oswaldo Quiros Patient Status:  Inpatient    1965 MRN [id number]   [facility] 3NW-A Attending Dany Michaels MD   Hosp Day # 1 PCP Sheridan Banda MD     Subjective:  Patient states she continues to controlled  6. Follow up in one week    My total face time with this patient was 20 minutes. Greater than half of our visit was spent in counseling the patient on the above listed diagnoses and treatment options.     Tomasa Rodriguez  72/28/7392  6:67 AM

## 2019-12-19 NOTE — PROGRESS NOTES
NURSING ADMISSION NOTE      Patient admitted via Cart  Oriented to room. Safety precautions initiated. Bed in low position. Call light in reach. Patient admitted at this time. Databases completed. Assessment completed. Pain medication given.

## 2019-12-19 NOTE — PLAN OF CARE
Patient is alert and orientated x4. RA. SCDs in place. Due to void. Denies N/V. Denies passing gas. Diet tolerated. Up with stand by. IV fluids infusing. Pain controlled with IV morphine.  Lap sites x4 with steri strips, gauze and tape over lower lap site d maintained  Description  INTERVENTIONS:  - Monitor labs and assess for signs and symptoms of volume excess or deficit  - Monitor intake, output and patient weight  - Monitor urine specific gravity, serum osmolarity and serum sodium as indicated or ordered

## 2019-12-20 NOTE — PAYOR COMM NOTE
--------------  DISCHARGE REVIEW    Payor: [facility]HA [facility]  Subscriber #:  [id number]  Authorization Number: [id number]    Admit date: 12/18/19  Admit time:  2036  Discharge Date: 12/19/2019  7:30 PM     Admitting Physician: Jose J Rene MD  Attending Physi

## 2019-12-20 NOTE — DISCHARGE PLANNING
DISCHARGE PAPERWORK DISCUSSED WITH PT, ALL QUESTIONS ANSWERED. PT INSTRUCTED TO  Jamie 108. VS WNL.  PT STABLE, DISCHARGE PAPERWORK GIVEN TO PT.

## 2019-12-20 NOTE — PAYOR COMM NOTE
--------------  DISCHARGE REVIEW    Payor: RADHA SALEEM  Subscriber #:  [id number]  Authorization Number: [id number]    Admit date: 12/18/19  Admit time:  2036  Discharge Date: 12/19/2019  7:30 PM     Admitting Physician: Suzie Pearl MD  Attending Physi

## 2019-12-20 NOTE — PROGRESS NOTES
PT C/O OF BEING DISTENDED. PT ABDOMEN SOFT ROUND AND DISTENDED. PT PASSING FLATUS, ONLY HAS BEEN UP TO WALK IN GLEASON X1, AND SIT IN CHAIR X1 FOR  ABOUT 30 MINS. PT'S B/P IMPROVING, ASYM. WILL CONTINUE TO MONITOR.

## 2019-12-31 ENCOUNTER — OFFICE VISIT (OUTPATIENT)
Dept: SURGERY | Facility: CLINIC | Age: 54
End: 2019-12-31

## 2019-12-31 VITALS
DIASTOLIC BLOOD PRESSURE: 71 MMHG | WEIGHT: 161 LBS | HEART RATE: 83 BPM | TEMPERATURE: 99 F | SYSTOLIC BLOOD PRESSURE: 101 MMHG | BODY MASS INDEX: 24 KG/M2

## 2019-12-31 DIAGNOSIS — Z90.49 HISTORY OF CHOLECYSTECTOMY: Primary | ICD-10-CM

## 2019-12-31 PROBLEM — K81.0 ACUTE CHOLECYSTITIS: Status: RESOLVED | Noted: 2019-12-18 | Resolved: 2019-12-31

## 2019-12-31 PROCEDURE — 99024 POSTOP FOLLOW-UP VISIT: CPT | Performed by: PHYSICIAN ASSISTANT

## 2019-12-31 NOTE — PROGRESS NOTES
Post Operative Visit Note       Active Problems  1.  History of cholecystectomy         Chief Complaint   Patient presents with:  Post-Op: 12/18 lap sukhdeep, pt c/o \"very bad cough and it hurts in my stomach area, like I have phlegm\"         History of Pres • ADENOIDECTOMY     • BLADDER TRANSVAGINAL TAPING SUSPENSION URETHROLYSIS N/A 5/24/2016    Performed by Harmony James MD at [facility] MAIN OR   • COLONOSCOPY  2001   • COLONOSCOPY  11/16    fair prep- repeat 3 yrs   • COLONOSCOPY N/A 11/2/2016    Perform every 6 (six) hours as needed for Pain., Disp: , Rfl:   •  HYDROcodone-acetaminophen (NORCO) 5-325 MG Oral Tab, Take 1 tablet by mouth every 6 (six) hours as needed for Pain.  (Patient not taking: Reported on 12/31/2019 ), Disp: 10 tablet, Rfl: 0      Angie is no tenderness. There is no rebound. Abdomen is soft, nontender to palpation. Nondistended. Incisions are healing well. There is no surrounding erythema, edema or warmth. There are no hernias palpated.    Neurological: She is alert and oriented to p

## 2020-01-20 ENCOUNTER — HOSPITAL ENCOUNTER (OUTPATIENT)
Dept: MRI IMAGING | Age: 55
Discharge: HOME | End: 2020-01-20
Attending: INTERNAL MEDICINE
Payer: COMMERCIAL

## 2020-01-20 DIAGNOSIS — G47.62 SLEEP RELATED LEG CRAMPS: ICD-10-CM

## 2020-01-20 DIAGNOSIS — M54.40 LUMBAGO WITH SCIATICA, UNSPECIFIED SIDE: ICD-10-CM

## 2020-01-20 DIAGNOSIS — M54.16 RADICULOPATHY OF LUMBAR REGION: ICD-10-CM

## 2020-01-20 PROCEDURE — 72148 MRI LUMBAR SPINE W/O DYE: CPT | Performed by: INTERNAL MEDICINE

## 2020-01-20 PROCEDURE — 72195 MRI PELVIS W/O DYE: CPT | Performed by: INTERNAL MEDICINE

## 2020-01-20 PROCEDURE — 72158 MRI LUMBAR SPINE W/O & W/DYE: CPT | Performed by: INTERNAL MEDICINE

## 2020-01-20 PROCEDURE — A9575 INJ GADOTERATE MEGLUMI 0.1ML: HCPCS | Performed by: INTERNAL MEDICINE

## 2020-01-24 ENCOUNTER — TELEPHONE (OUTPATIENT)
Dept: SCHEDULING | Age: 55
End: 2020-01-24

## 2020-01-24 ENCOUNTER — WALK IN (OUTPATIENT)
Dept: URGENT CARE | Age: 55
End: 2020-01-24

## 2020-01-24 VITALS
SYSTOLIC BLOOD PRESSURE: 98 MMHG | DIASTOLIC BLOOD PRESSURE: 62 MMHG | BODY MASS INDEX: 24.55 KG/M2 | WEIGHT: 162 LBS | HEART RATE: 82 BPM | HEIGHT: 68 IN | RESPIRATION RATE: 14 BRPM | OXYGEN SATURATION: 98 % | TEMPERATURE: 98.8 F

## 2020-01-24 DIAGNOSIS — Z11.1 SCREENING-PULMONARY TB: Primary | ICD-10-CM

## 2020-01-24 PROCEDURE — 86580 TB INTRADERMAL TEST: CPT | Performed by: NURSE PRACTITIONER

## 2020-01-24 PROCEDURE — 99396 PREV VISIT EST AGE 40-64: CPT | Performed by: NURSE PRACTITIONER

## 2020-01-24 ASSESSMENT — ENCOUNTER SYMPTOMS
PHOTOPHOBIA: 0
PSYCHIATRIC NEGATIVE: 1
EYE DISCHARGE: 0
VOMITING: 0
SEIZURES: 0
BACK PAIN: 0
SORE THROAT: 0
FATIGUE: 0
DIARRHEA: 0
HEADACHES: 0
NUMBNESS: 0
HEMATOLOGIC/LYMPHATIC NEGATIVE: 1
APNEA: 0
SINUS PRESSURE: 0
CHEST TIGHTNESS: 0
LIGHT-HEADEDNESS: 0
FEVER: 0
EYE REDNESS: 0
SINUS PAIN: 0
DIZZINESS: 0
POLYPHAGIA: 0
POLYDIPSIA: 0
CHILLS: 0
WHEEZING: 0
NAUSEA: 0
SPEECH DIFFICULTY: 0
ABDOMINAL DISTENTION: 0
FACIAL ASYMMETRY: 0
TREMORS: 0
SHORTNESS OF BREATH: 0
COUGH: 0
STRIDOR: 0

## 2020-01-27 ENCOUNTER — WALK IN (OUTPATIENT)
Dept: URGENT CARE | Age: 55
End: 2020-01-27

## 2020-01-27 DIAGNOSIS — Z11.1 ENCOUNTER FOR PPD SKIN TEST READING: Primary | ICD-10-CM

## 2020-01-27 LAB
INDURATION: 0 MM (ref 0–?)
SKIN TEST RESULT: NEGATIVE

## 2020-01-27 PROCEDURE — X1094 NO CHARGE VISIT: HCPCS | Performed by: NURSE PRACTITIONER

## 2020-05-07 ENCOUNTER — HOSPITAL ENCOUNTER (OUTPATIENT)
Dept: MAMMOGRAPHY | Age: 55
Discharge: HOME | End: 2020-05-07
Attending: NURSE PRACTITIONER
Payer: COMMERCIAL

## 2020-05-07 ENCOUNTER — HOSPITAL ENCOUNTER (OUTPATIENT)
Dept: ULTRASOUND IMAGING | Age: 55
Discharge: HOME | End: 2020-05-07
Attending: NURSE PRACTITIONER
Payer: COMMERCIAL

## 2020-05-07 DIAGNOSIS — N64.4 MASTODYNIA: ICD-10-CM

## 2020-05-07 PROCEDURE — 76642 ULTRASOUND BREAST LIMITED: CPT | Performed by: NURSE PRACTITIONER

## 2020-05-07 PROCEDURE — 77066 DX MAMMO INCL CAD BI: CPT | Performed by: NURSE PRACTITIONER

## 2020-05-07 PROCEDURE — 77062 BREAST TOMOSYNTHESIS BI: CPT | Performed by: NURSE PRACTITIONER

## 2020-08-13 ENCOUNTER — HOSPITAL ENCOUNTER (OUTPATIENT)
Dept: ULTRASOUND IMAGING | Age: 55
Discharge: HOME | End: 2020-08-13
Attending: NURSE PRACTITIONER
Payer: COMMERCIAL

## 2020-08-13 DIAGNOSIS — N64.4 MASTODYNIA: ICD-10-CM

## 2020-08-13 DIAGNOSIS — N63.11 LUMP IN UPPER OUTER QUADRANT OF RIGHT BREAST: ICD-10-CM

## 2020-08-13 PROCEDURE — 76642 ULTRASOUND BREAST LIMITED: CPT | Performed by: NURSE PRACTITIONER

## 2020-08-24 RX ORDER — NAPROXEN 500 MG/1
TABLET ORAL
COMMUNITY
Start: 2020-07-28 | End: 2020-09-29 | Stop reason: ALTCHOICE

## 2020-08-25 ENCOUNTER — OFFICE VISIT (OUTPATIENT)
Dept: SURGERY | Facility: CLINIC | Age: 55
End: 2020-08-25
Payer: COMMERCIAL

## 2020-08-25 VITALS
DIASTOLIC BLOOD PRESSURE: 69 MMHG | BODY MASS INDEX: 23.67 KG/M2 | RESPIRATION RATE: 16 BRPM | SYSTOLIC BLOOD PRESSURE: 107 MMHG | HEART RATE: 79 BPM | OXYGEN SATURATION: 99 % | HEIGHT: 68 IN | WEIGHT: 156.19 LBS

## 2020-08-25 DIAGNOSIS — N64.4 BREAST PAIN: ICD-10-CM

## 2020-08-25 DIAGNOSIS — N63.10 BREAST MASS, RIGHT: Primary | ICD-10-CM

## 2020-08-25 PROCEDURE — 3074F SYST BP LT 130 MM HG: CPT | Performed by: SURGERY

## 2020-08-25 PROCEDURE — 3078F DIAST BP <80 MM HG: CPT | Performed by: SURGERY

## 2020-08-25 PROCEDURE — 99244 OFF/OP CNSLTJ NEW/EST MOD 40: CPT | Performed by: SURGERY

## 2020-08-25 PROCEDURE — 3008F BODY MASS INDEX DOCD: CPT | Performed by: SURGERY

## 2020-08-25 NOTE — PROGRESS NOTES
Breast Surgery New Patient Consultation    This is the first visit for this 49year old woman, referred by Dr. Annika Anderson, who presents for evaluation of right breast mass and pain.     History of Present Illness:   Ms. Shefali Graf is a 49year old woman who swallowing 2019   • Scoliosis    • Sinusitis    • Sleep disturbance    • Sputum production    • Stress    • Tooth infection    • Vertigo    • Weight gain        Past Surgical History:   Procedure Laterality Date   • ADENOIDECTOMY     • BLADDER TRANSVAGINAL (Kidney Cancer) Mother 46   • Suicide History Paternal Grandfather          by suicide   • Other (Lung Cancer) Paternal Grandfather         smoked cigars   • Breast Cancer Maternal Aunt 72        estimated age   • Alcohol and Other Disorders Associated pain with swallowing, reflux symptoms, vomiting, dark or bloody stools, constipation, yellowing of the skin, indigestion, nausea,+change in bowel habits, +diarrhea, abdominal pain or vomiting blood.      Genitourinary:  The patient denies+ frequent urinatio without palpable masses/nodules. There are no palpable masses. The trachea is in the midline. Conjunctiva are clear, non-icteric. Chest: The chest expands symmetrically. The lungs are clear to auscultation. Heart: The rhythm is regular.   There are no discussed the option of needle biopsy, however in light of the fact that the mass is causing discomfort, surgical excision would be recommended regardless of the etiology.   Was a possible complications of a right breast wire localized excisional biopsy of

## 2020-08-25 NOTE — PATIENT INSTRUCTIONS
Dr. Jf Colmenares MD    [facility]  Tel:  [phone]      [address], [place]   Fax: [phone]    _____________________________________________________________________      Surgery/Procedure: Right Kala procedure/surgery. This includes aspirin, baby aspirin, Plavix, Motrin, Ibuprofen, herbal supplements, diet medications, vitamin E, fish oil and green tea supplements. Please check other supplements for these ingredients.  *TYLENOL or acetaminophen is accep

## 2020-08-26 ENCOUNTER — TELEPHONE (OUTPATIENT)
Dept: SURGERY | Facility: CLINIC | Age: 55
End: 2020-08-26

## 2020-08-26 DIAGNOSIS — N63.10 MASS OF RIGHT BREAST: Primary | ICD-10-CM

## 2020-08-26 NOTE — TELEPHONE ENCOUNTER
I called the patient and scheduled her procedure with Dr yHun Davidson on 09/22/2020 at [facility]. I offered 10/01/20 per Isaac tao and patient requested earlier appt as she advised she is in pain.  Confirmed location

## 2020-09-09 ENCOUNTER — TELEPHONE (OUTPATIENT)
Dept: SURGERY | Facility: CLINIC | Age: 55
End: 2020-09-09

## 2020-09-15 RX ORDER — DIAZEPAM 5 MG/1
5 TABLET ORAL AS NEEDED
Status: CANCELLED | OUTPATIENT
Start: 2020-09-15

## 2020-09-17 ENCOUNTER — TELEPHONE (OUTPATIENT)
Dept: MAMMOGRAPHY | Facility: HOSPITAL | Age: 55
End: 2020-09-17

## 2020-09-18 ENCOUNTER — TELEPHONE (OUTPATIENT)
Dept: MAMMOGRAPHY | Age: 55
End: 2020-09-18

## 2020-09-19 ENCOUNTER — APPOINTMENT (OUTPATIENT)
Dept: LAB | Age: 55
End: 2020-09-19
Attending: SURGERY
Payer: COMMERCIAL

## 2020-09-19 DIAGNOSIS — N63.10 MASS OF RIGHT BREAST: ICD-10-CM

## 2020-09-20 LAB — SARS-COV-2 RNA RESP QL NAA+PROBE: NOT DETECTED

## 2020-09-21 NOTE — IMAGING NOTE
Spoke with pt and explanation given re wire loc done prior to surgery tomorrow. Told pt will start in preop and will travel to [facility] via wheelchair for procedure to be done by radiologist. Will return to preop after loc.  Verbalized understanding of explanati

## 2020-09-22 ENCOUNTER — ANESTHESIA EVENT (OUTPATIENT)
Dept: SURGERY | Facility: HOSPITAL | Age: 55
End: 2020-09-22
Payer: COMMERCIAL

## 2020-09-22 ENCOUNTER — HOSPITAL ENCOUNTER (OUTPATIENT)
Dept: MAMMOGRAPHY | Facility: HOSPITAL | Age: 55
Discharge: HOME | End: 2020-09-22
Attending: SURGERY
Payer: COMMERCIAL

## 2020-09-22 ENCOUNTER — HOSPITAL ENCOUNTER (OUTPATIENT)
Facility: HOSPITAL | Age: 55
Setting detail: HOSPITAL OUTPATIENT SURGERY
Discharge: HOME | End: 2020-09-22
Attending: SURGERY | Admitting: SURGERY
Payer: COMMERCIAL

## 2020-09-22 ENCOUNTER — ANESTHESIA (OUTPATIENT)
Dept: SURGERY | Facility: HOSPITAL | Age: 55
End: 2020-09-22
Payer: COMMERCIAL

## 2020-09-22 VITALS
HEART RATE: 71 BPM | WEIGHT: 154.31 LBS | TEMPERATURE: 98 F | DIASTOLIC BLOOD PRESSURE: 62 MMHG | BODY MASS INDEX: 23.39 KG/M2 | HEIGHT: 68 IN | OXYGEN SATURATION: 100 % | RESPIRATION RATE: 20 BRPM | SYSTOLIC BLOOD PRESSURE: 102 MMHG

## 2020-09-22 DIAGNOSIS — N63.10 MASS OF RIGHT BREAST: Primary | ICD-10-CM

## 2020-09-22 DIAGNOSIS — N63.10 MASS OF RIGHT BREAST: ICD-10-CM

## 2020-09-22 LAB — GLUCOSE BLD-MCNC: 84 MG/DL (ref 70–99)

## 2020-09-22 PROCEDURE — 82962 GLUCOSE BLOOD TEST: CPT

## 2020-09-22 PROCEDURE — 88305 TISSUE EXAM BY PATHOLOGIST: CPT | Performed by: SURGERY

## 2020-09-22 PROCEDURE — 0HBT0ZX EXCISION OF RIGHT BREAST, OPEN APPROACH, DIAGNOSTIC: ICD-10-PCS | Performed by: SURGERY

## 2020-09-22 PROCEDURE — 19285 PERQ DEV BREAST 1ST US IMAG: CPT | Performed by: SURGERY

## 2020-09-22 PROCEDURE — 76098 X-RAY EXAM SURGICAL SPECIMEN: CPT | Performed by: SURGERY

## 2020-09-22 RX ORDER — HYDROCODONE BITARTRATE AND ACETAMINOPHEN 5; 325 MG/1; MG/1
1 TABLET ORAL AS NEEDED
Status: DISCONTINUED | OUTPATIENT
Start: 2020-09-22 | End: 2020-09-22

## 2020-09-22 RX ORDER — HYDROCODONE BITARTRATE AND ACETAMINOPHEN 5; 325 MG/1; MG/1
2 TABLET ORAL AS NEEDED
Status: DISCONTINUED | OUTPATIENT
Start: 2020-09-22 | End: 2020-09-22

## 2020-09-22 RX ORDER — SODIUM CHLORIDE, SODIUM LACTATE, POTASSIUM CHLORIDE, CALCIUM CHLORIDE 600; 310; 30; 20 MG/100ML; MG/100ML; MG/100ML; MG/100ML
INJECTION, SOLUTION INTRAVENOUS CONTINUOUS
Status: DISCONTINUED | OUTPATIENT
Start: 2020-09-22 | End: 2020-09-22

## 2020-09-22 RX ORDER — LIDOCAINE HYDROCHLORIDE AND EPINEPHRINE 10; 10 MG/ML; UG/ML
INJECTION, SOLUTION INFILTRATION; PERINEURAL AS NEEDED
Status: DISCONTINUED | OUTPATIENT
Start: 2020-09-22 | End: 2020-09-22 | Stop reason: HOSPADM

## 2020-09-22 RX ORDER — MEPERIDINE HYDROCHLORIDE 25 MG/ML
12.5 INJECTION INTRAMUSCULAR; INTRAVENOUS; SUBCUTANEOUS AS NEEDED
Status: DISCONTINUED | OUTPATIENT
Start: 2020-09-22 | End: 2020-09-22

## 2020-09-22 RX ORDER — DIAZEPAM 5 MG/1
5 TABLET ORAL AS NEEDED
Status: DISCONTINUED | OUTPATIENT
Start: 2020-09-22 | End: 2020-09-22 | Stop reason: HOSPADM

## 2020-09-22 RX ORDER — CEFAZOLIN SODIUM/WATER 2 G/20 ML
SYRINGE (ML) INTRAVENOUS
Status: DISCONTINUED
Start: 2020-09-22 | End: 2020-09-22

## 2020-09-22 RX ORDER — MIDAZOLAM HYDROCHLORIDE 1 MG/ML
1 INJECTION INTRAMUSCULAR; INTRAVENOUS EVERY 5 MIN PRN
Status: DISCONTINUED | OUTPATIENT
Start: 2020-09-22 | End: 2020-09-22

## 2020-09-22 RX ORDER — LABETALOL HYDROCHLORIDE 5 MG/ML
5 INJECTION, SOLUTION INTRAVENOUS EVERY 5 MIN PRN
Status: DISCONTINUED | OUTPATIENT
Start: 2020-09-22 | End: 2020-09-22

## 2020-09-22 RX ORDER — NALOXONE HYDROCHLORIDE 0.4 MG/ML
80 INJECTION, SOLUTION INTRAMUSCULAR; INTRAVENOUS; SUBCUTANEOUS AS NEEDED
Status: DISCONTINUED | OUTPATIENT
Start: 2020-09-22 | End: 2020-09-22

## 2020-09-22 RX ORDER — DEXAMETHASONE SODIUM PHOSPHATE 4 MG/ML
VIAL (ML) INJECTION AS NEEDED
Status: DISCONTINUED | OUTPATIENT
Start: 2020-09-22 | End: 2020-09-22 | Stop reason: SURG

## 2020-09-22 RX ORDER — CEFAZOLIN SODIUM/WATER 2 G/20 ML
2 SYRINGE (ML) INTRAVENOUS ONCE
Status: COMPLETED | OUTPATIENT
Start: 2020-09-22 | End: 2020-09-22

## 2020-09-22 RX ORDER — ONDANSETRON 2 MG/ML
4 INJECTION INTRAMUSCULAR; INTRAVENOUS AS NEEDED
Status: DISCONTINUED | OUTPATIENT
Start: 2020-09-22 | End: 2020-09-22

## 2020-09-22 RX ORDER — MIDAZOLAM HYDROCHLORIDE 1 MG/ML
INJECTION INTRAMUSCULAR; INTRAVENOUS AS NEEDED
Status: DISCONTINUED | OUTPATIENT
Start: 2020-09-22 | End: 2020-09-22 | Stop reason: SURG

## 2020-09-22 RX ORDER — TRAMADOL HYDROCHLORIDE 50 MG/1
TABLET ORAL EVERY 6 HOURS PRN
Qty: 20 TABLET | Refills: 0 | Status: SHIPPED | OUTPATIENT
Start: 2020-09-22 | End: 2020-09-29 | Stop reason: ALTCHOICE

## 2020-09-22 RX ORDER — ACETAMINOPHEN 500 MG
1000 TABLET ORAL ONCE
COMMUNITY
End: 2020-09-29 | Stop reason: ALTCHOICE

## 2020-09-22 RX ORDER — ONDANSETRON 2 MG/ML
INJECTION INTRAMUSCULAR; INTRAVENOUS AS NEEDED
Status: DISCONTINUED | OUTPATIENT
Start: 2020-09-22 | End: 2020-09-22 | Stop reason: SURG

## 2020-09-22 RX ORDER — BUPIVACAINE HYDROCHLORIDE 5 MG/ML
INJECTION, SOLUTION EPIDURAL; INTRACAUDAL AS NEEDED
Status: DISCONTINUED | OUTPATIENT
Start: 2020-09-22 | End: 2020-09-22 | Stop reason: HOSPADM

## 2020-09-22 RX ORDER — ACETAMINOPHEN 500 MG
1000 TABLET ORAL ONCE
Status: DISCONTINUED | OUTPATIENT
Start: 2020-09-22 | End: 2020-09-22 | Stop reason: HOSPADM

## 2020-09-22 RX ADMIN — CEFAZOLIN SODIUM/WATER 2 G: 2 G/20 ML SYRINGE (ML) INTRAVENOUS at 15:31:00

## 2020-09-22 RX ADMIN — DEXAMETHASONE SODIUM PHOSPHATE 4 MG: 4 MG/ML VIAL (ML) INJECTION at 15:33:00

## 2020-09-22 RX ADMIN — ONDANSETRON 4 MG: 2 INJECTION INTRAMUSCULAR; INTRAVENOUS at 15:33:00

## 2020-09-22 RX ADMIN — SODIUM CHLORIDE, SODIUM LACTATE, POTASSIUM CHLORIDE, CALCIUM CHLORIDE: 600; 310; 30; 20 INJECTION, SOLUTION INTRAVENOUS at 15:56:00

## 2020-09-22 RX ADMIN — MIDAZOLAM HYDROCHLORIDE 2 MG: 1 INJECTION INTRAMUSCULAR; INTRAVENOUS at 15:27:00

## 2020-09-22 NOTE — ANESTHESIA POSTPROCEDURE EVALUATION
[address] [facility],Third Floor Patient Status:  Hospital Outpatient Surgery   Age/Gender 49year old female MRN [id number]   [facility] SURGERY Attending Chasity Ludwig MD   Hosp Day # 0 PCP [place] Maddox MD       Anesthesia Post-op N

## 2020-09-22 NOTE — ANESTHESIA PREPROCEDURE EVALUATION
PRE-OP EVALUATION    Patient Name: Robbie Ugarte    Pre-op Diagnosis: Mass of right breast [N63.10]    Procedure(s):  Right Breast Wire Localized Excisional Biopsy of the 8 o'clock Right Breast Nodule    Surgeon(s) and Role:     Emmanuel Rivas MD [facility] ENDOSCOPY   • HYSTERECTOMY      unsure if total or partial   • LAPAROSCOPIC CHOLECYSTECTOMY N/A 12/18/2019    Performed by Garcia Navarro MD at [facility] MAIN OR   • ARIANE LOCALIZATION WIRE 1 SITE RIGHT (BPG=75555) Right 1982    Benign   • OTHER SURGICAL HISTO

## 2020-09-22 NOTE — H&P
History of Present Illness:   Ms. Genie Chacon is a 49year old woman who presents with self detected discomfort related to a palpable mass in the right axilla. She reports that she is noted this since March 2020.   Given the discomfort and presence of Weight gain                 Past Surgical History:   Procedure Laterality Date   • ADENOIDECTOMY       • BLADDER TRANSVAGINAL TAPING SUSPENSION URETHROLYSIS N/A 5/24/2016     Performed by Soumya Baez MD at [facility] MAIN OR   • [address] [place] [address]   2019   • Other (Lung Cancer) Paternal Grandfather           smoked cigars   • Breast Cancer Maternal Aunt 72         estimated age   • Alcohol and Other Disorders Associated Maternal Grandmother     • Alcohol and Other Disorders Associated Maternal Grandfather   stools, constipation, yellowing of the skin, indigestion, nausea,+change in bowel habits, +diarrhea, abdominal pain or vomiting blood.      Genitourinary:  The patient denies+ frequent urination, +needing to get up at night to urinate, urinary hesitancy or masses. The trachea is in the midline. Conjunctiva are clear, non-icteric.     Chest: The chest expands symmetrically. The lungs are clear to auscultation.     Heart: The rhythm is regular.   There are no murmurs, rubs, gallops or thrills.     Breasts:  Her light of the fact that the mass is causing discomfort, surgical excision would be recommended regardless of the etiology.   Was a possible complications of a right breast wire localized excisional biopsy of the 8-9:00 breast nodule were discussed with the p

## 2020-09-22 NOTE — BRIEF OP NOTE
Pre-Operative Diagnosis: Mass of right breast [N63.10]     Post-Operative Diagnosis: Mass of right breast [N63.10]      Procedure Performed:   Procedure(s):  Right Breast Wire Localized Excisional Biopsy of the 8 o'clock Right Breast Nodule    Surgeon(s) a

## 2020-09-22 NOTE — IMAGING NOTE
Pt arrived from [facility] in [facility]. Assisted Dr. Dewayne Mayorga with right breast wire loc. Pt education provided. Questions answered and emotional support given. Wire secure with dressing. Pt assisted to [facility] and awaits transport back to [facility].

## 2020-09-23 NOTE — OPERATIVE REPORT
[address]    PATIENT'S NAME: Thony ALFARO   ATTENDING PHYSICIAN: Jyothi Langston. Mirella Argueta M.D. OPERATING PHYSICIAN: Jyothi Langston. Mirella Argueta M.D.    PATIENT ACCOUNT#:   [de-identified]    LOCATION:  PREOPASCC  PRE ASCC 2 EDWP 10  MEDICAL RECORD #:   [id number] infiltrate the skin and subcutaneous tissues at her incision site. A radial incision was made along the 9 o'clock border with a 15-blade knife through the skin. The wire was identified, brought into the field.   Using sharp dissection and electrocautery,

## 2020-09-29 ENCOUNTER — OFFICE VISIT (OUTPATIENT)
Dept: SURGERY | Facility: CLINIC | Age: 55
End: 2020-09-29
Payer: COMMERCIAL

## 2020-09-29 VITALS
OXYGEN SATURATION: 99 % | SYSTOLIC BLOOD PRESSURE: 97 MMHG | HEART RATE: 87 BPM | DIASTOLIC BLOOD PRESSURE: 64 MMHG | RESPIRATION RATE: 16 BRPM

## 2020-09-29 DIAGNOSIS — D24.1 FIBROADENOMA, RIGHT: Primary | ICD-10-CM

## 2020-09-29 PROCEDURE — 3074F SYST BP LT 130 MM HG: CPT | Performed by: SURGERY

## 2020-09-29 PROCEDURE — 99024 POSTOP FOLLOW-UP VISIT: CPT | Performed by: SURGERY

## 2020-09-29 PROCEDURE — 3078F DIAST BP <80 MM HG: CPT | Performed by: SURGERY

## 2020-11-19 NOTE — PROGRESS NOTES
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 Stage: Not applicable    Disease Status:  Surgical treatment complete, no further treatment pending. History:    This 49 y • Itch of skin    • Leaking of urine    • Night sweats    • Pain in joints    • Pain with bowel movements    • Painful swallowing 2019   • Painful urination    • Problems with swallowing 2019   • Scoliosis    • Sinusitis    • Sleep disturbance    • Sputu paralysis of left side    Family History:   Family History   Problem Relation Age of Onset   • Heart Disease Father    • Heart Attack Father    • Other (Kidney Cancer) Mother 46   • Suicide History Paternal Grandfather          by suicide   • Other (Marissa Steiners swelling of the legs or chest pain while walking.     Breasts:  See history of present illness    Gastrointestinal:     There is no history of difficulty or pain with swallowing, reflux symptoms, vomiting, dark or bloody stools, constipation, yellowing of t appropriate. HEENT: The head is normocephalic. The neck is supple. The thyroid is not enlarged and is without palpable masses/nodules. There are no palpable masses. The trachea is in the midline. Conjunctiva are clear, non-icteric.     Chest: The chest e further treatment will be needed. The patient conveys that the initial discomfort she was having in the right breast has now resolved with the surgery. I explained that surgical swelling may be obscuring resolution of her symptoms.   However, I did discus

## 2020-12-02 ENCOUNTER — TELEPHONE (OUTPATIENT)
Dept: SURGERY | Facility: CLINIC | Age: 55
End: 2020-12-02

## 2020-12-02 DIAGNOSIS — Z80.3 FAMILY HISTORY OF BREAST CANCER: ICD-10-CM

## 2020-12-02 DIAGNOSIS — Z91.89 AT HIGH RISK FOR BREAST CANCER: Primary | ICD-10-CM

## 2020-12-02 DIAGNOSIS — N64.4 BREAST PAIN, RIGHT: ICD-10-CM

## 2020-12-02 NOTE — TELEPHONE ENCOUNTER
Calling pt in regards to her breast pain, visit to the ED, and subsequent imaging. No answer. LVM after verifying verbal release. Informed pt that per Dr. Woody Bonner, [facility] her get MRI.  Please call her and explain US did not help us prior so needs MRI for f

## 2020-12-02 NOTE — TELEPHONE ENCOUNTER
----- Message from Kailash De MD sent at 12/2/2020  3:18 PM CST -----  Regarding: FW: Referral Request  Contact: [phone]  Have her get MRI. Please call her and explain US did not help us prior so needs MRI for further detail, thanks.  Order is i

## 2020-12-03 ENCOUNTER — HOSPITAL ENCOUNTER (OUTPATIENT)
Dept: MRI IMAGING | Age: 55
Discharge: HOME | End: 2020-12-03
Attending: SURGERY
Payer: COMMERCIAL

## 2020-12-03 DIAGNOSIS — N64.4 BREAST PAIN, RIGHT: ICD-10-CM

## 2020-12-03 DIAGNOSIS — Z80.3 FAMILY HISTORY OF BREAST CANCER: ICD-10-CM

## 2020-12-03 DIAGNOSIS — Z91.89 AT HIGH RISK FOR BREAST CANCER: ICD-10-CM

## 2020-12-03 PROCEDURE — A9575 INJ GADOTERATE MEGLUMI 0.1ML: HCPCS | Performed by: SURGERY

## 2020-12-03 PROCEDURE — 77049 MRI BREAST C-+ W/CAD BI: CPT | Performed by: SURGERY

## 2021-01-27 ENCOUNTER — OCC HEALTH (OUTPATIENT)
Dept: OCCUPATIONAL MEDICINE | Age: 56
End: 2021-01-27
Attending: FAMILY MEDICINE

## 2021-01-27 ENCOUNTER — HOSPITAL ENCOUNTER (OUTPATIENT)
Dept: GENERAL RADIOLOGY | Age: 56
Discharge: HOME | End: 2021-01-27
Attending: FAMILY MEDICINE

## 2021-01-27 DIAGNOSIS — S47.1XXA: Primary | ICD-10-CM

## 2021-01-27 DIAGNOSIS — S47.1XXA: ICD-10-CM

## 2021-01-27 PROCEDURE — 73010 X-RAY EXAM OF SHOULDER BLADE: CPT | Performed by: FAMILY MEDICINE

## 2021-01-27 RX ORDER — IBUPROFEN 600 MG/1
600 TABLET ORAL EVERY 6 HOURS PRN
Qty: 60 TABLET | Refills: 1 | Status: SHIPPED | OUTPATIENT
Start: 2021-01-27 | End: 2021-07-06 | Stop reason: ALTCHOICE

## 2021-02-03 ENCOUNTER — OFFICE VISIT (OUTPATIENT)
Dept: OCCUPATIONAL MEDICINE | Age: 56
End: 2021-02-03
Attending: FAMILY MEDICINE

## 2021-02-17 ENCOUNTER — OFFICE VISIT (OUTPATIENT)
Dept: UROLOGY | Facility: HOSPITAL | Age: 56
End: 2021-02-17
Attending: OBSTETRICS & GYNECOLOGY
Payer: COMMERCIAL

## 2021-02-17 VITALS
BODY MASS INDEX: 23.34 KG/M2 | SYSTOLIC BLOOD PRESSURE: 98 MMHG | HEIGHT: 68 IN | DIASTOLIC BLOOD PRESSURE: 60 MMHG | WEIGHT: 154 LBS

## 2021-02-17 DIAGNOSIS — Z72.0 TOBACCO ABUSE: ICD-10-CM

## 2021-02-17 DIAGNOSIS — R39.89 BLADDER PAIN: Primary | ICD-10-CM

## 2021-02-17 DIAGNOSIS — M62.89 PELVIC FLOOR TENSION: ICD-10-CM

## 2021-02-17 DIAGNOSIS — N32.81 URGENCY-FREQUENCY SYNDROME: ICD-10-CM

## 2021-02-17 PROCEDURE — 99212 OFFICE O/P EST SF 10 MIN: CPT

## 2021-02-17 PROCEDURE — 88108 CYTOPATH CONCENTRATE TECH: CPT | Performed by: OBSTETRICS & GYNECOLOGY

## 2021-02-17 RX ORDER — AMITRIPTYLINE HYDROCHLORIDE 10 MG/1
10 TABLET, FILM COATED ORAL NIGHTLY
Qty: 60 TABLET | Refills: 2 | Status: SHIPPED | OUTPATIENT
Start: 2021-02-17 | End: 2021-07-06

## 2021-02-17 NOTE — PROGRESS NOTES
ID: Linnea Zuleta  : 1965  Date: 2021       Patient presents with: Incontinence  Bladder Pain      HPI:  The patient is a  year-old female,  (vaginal deliveries), who is self referred.  She presents for evaluation of urinary incontinenc 2019   • Painful urination    • Problems with swallowing 2019   • Scoliosis    • Sinusitis    • Sleep disturbance    • Sputum production    • Stress    • Tooth infection    • Vertigo    • Weight gain       Past Surgical History:   Procedure Laterality Date Alcohol/week: 0.0 standard drinks      Frequency: Monthly or less      Drinks per session: 1 or 2      Binge frequency: Never      Comment: rarely; less than 1 drink/week    Drug use: Yes      Types: Cannabis       Allergies:    Adhesive Tape Tender  Perineal Sensation:  Normal      PELVIC SUPPORT:  [place]:  0  Ant:  0  Post:  0  CST:  negative  UVJ: not hypermobile    The patient voided 500 mL in the privacy of the bathroom. Specimen collected for urine cytology.      Procedure bladder scan -[id number]

## 2021-02-19 LAB — NON GYNE INTERPRETATION: NEGATIVE

## 2021-03-05 ENCOUNTER — TELEPHONE (OUTPATIENT)
Dept: UROLOGY | Facility: HOSPITAL | Age: 56
End: 2021-03-05

## 2021-03-05 NOTE — TELEPHONE ENCOUNTER
Patient called with c/o increased pelvic pressure, feels a weight in her vagina, reports urgency and frequency has increased, reports leakage continues, wears briefs, cannot get into PT until the end of March, offered to have patient come in for a UTI scre

## 2021-03-05 NOTE — TELEPHONE ENCOUNTER
Patient called back, left a message to call her back, attempted to call patient, left a voice mail message asking if she wanted an instill today.

## 2021-03-29 ENCOUNTER — OFFICE VISIT (OUTPATIENT)
Dept: PHYSICAL THERAPY | Age: 56
End: 2021-03-29
Attending: OBSTETRICS & GYNECOLOGY
Payer: COMMERCIAL

## 2021-03-29 DIAGNOSIS — N32.81 URGENCY-FREQUENCY SYNDROME: ICD-10-CM

## 2021-03-29 DIAGNOSIS — M62.89 PELVIC FLOOR TENSION: ICD-10-CM

## 2021-03-29 DIAGNOSIS — R39.89 BLADDER PAIN: ICD-10-CM

## 2021-03-29 PROCEDURE — 97535 SELF CARE MNGMENT TRAINING: CPT

## 2021-03-29 PROCEDURE — 97140 MANUAL THERAPY 1/> REGIONS: CPT

## 2021-03-29 PROCEDURE — 97163 PT EVAL HIGH COMPLEX 45 MIN: CPT

## 2021-03-29 PROCEDURE — 97112 NEUROMUSCULAR REEDUCATION: CPT

## 2021-03-29 NOTE — PROGRESS NOTES
MUSCULOSKELETAL AND PELVIC FLOOR EVALUATION:     Referring Physician: Dr. Autumn Wood  Diagnosis: Bladder pain (R39.89)  Pelvic floor tension (N34.3)  Urgency-frequency syndrome (N32.81)     Date of Service: 3/29/2021     PATIENT SUMMARY   Frederick Denis reconstruction. Pt goals include \"I want to wear under ware. \"  Past medical history was reviewed with Charlene Diaz.  Significant findings include  has a past medical history of Anesthesia complication, Back pain, Back problem, Bad breath, Blurred vision, Chest p therapy today with complaints of 4/10 Bladder constant pain; urination every 15-45 minutes; Incomplete Bladder emptying;  Nocturia 1x/ night but wears disposable briefs at night;  and needs to wear 2-5 disposable briefs/ day due to full Bladder leakage. without reported pain. Skilled Pelvic Physical Therapy is medically necessary to address the above impairments and reach functional goals. OBJECTIVE:   Posture: R hand Dominant.   Pelvic Alignment: WFL  Gait: pt ambulates on level ground with L Peroneal Ani contraction with 2 count endurance; Moderate Muscular restriction of Super to Deep Transverse Perineum; Serve restriction on Urethrovaginals with Hypomobile Urethra/ Bladder; and Obturator Internus L>R trigger point with Pudendal nerve irritation. instructed on Levator Ani contraction inverse to diaphragmatic breathing to allow for pelvic brace with ADLs without valsalva.      Patient exhibits an increase in Levator Ani strength from 1/5 with 2 count hold to 3/5  with 10 count hold to allow for pelvi [phone]. If you have any questions, please contact me at Dept: [phone]  Sincerely,  Electronically signed by therapist: Rick Singleton.  Sasha PT, MPT, [facility]    Physician's certification required: Yes  I certify the need for these services furnished u

## 2021-03-29 NOTE — PATIENT INSTRUCTIONS
DIAPHRAGMATIC BREATHING     The diaphragm is a dome shaped muscle that forms the floor of the rib cage. It is the most efficient muscle for breathing and using this muscle aides in relaxation.  Diaphragmatic breathing is an important technique to learn Number of briefs used today              Recommendation of [facility] [phone]

## 2021-03-31 ENCOUNTER — OFFICE VISIT (OUTPATIENT)
Dept: PHYSICAL THERAPY | Age: 56
End: 2021-03-31
Attending: OBSTETRICS & GYNECOLOGY
Payer: COMMERCIAL

## 2021-03-31 PROCEDURE — 97112 NEUROMUSCULAR REEDUCATION: CPT

## 2021-03-31 PROCEDURE — 97140 MANUAL THERAPY 1/> REGIONS: CPT

## 2021-03-31 PROCEDURE — 97535 SELF CARE MNGMENT TRAINING: CPT

## 2021-03-31 NOTE — PATIENT INSTRUCTIONS
DIAPHRAGMATIC BREATHING     The diaphragm is a dome shaped muscle that forms the floor of the rib cage. It is the most efficient muscle for breathing and using this muscle aides in relaxation.  Diaphragmatic breathing is an important technique to learn Number of briefs used today              Recommendation of LCSW Ariadna Winter [phone]                BLADDER HEALTH      WHAT IS CONSIDERED NORMAL? • The average bladder can hold about 2 cups of urine before it needs to be emptied.   • tea) and citrus foods that you consume as these foods can be associated with increased sensation of urinary urgency and frequency. See the handout [facility] Diet Can Affect Your Bladder” for more information. • Limit the amount of alcohol you drink.  Alcohol in For tea drinkers: Non-citrus herbal Sun brewed tea      2.  Drinking enough and the right kinds of fluids  Many people with bladder control issues decrease their intake of liquids in hope that they will need to urinate less frequently or have less urinary bloating. This can occur if your body is not used to digesting fiber in large amounts. It is also important to drink at least 6-8 cups of water daily to keep the fiber moving through your system.   Below are some tips to help:  • Choose fresh fruits or vege 3.36   Lettuce (raw) ½ cup 0.24   Baked potato w/skin ½ cup 2.97   Spinach ½ cup 2.07   Squash ½ cup 2.87   Tomato (raw) ½ cup 1.17   Zucchini ½ cup 1.26   Beans     Green (canned) ½ cup 1.89   Kidney ½ cup 5.48   Lima ½ cup 4.25   Velasquez ½ cup 5.93   Fresh

## 2021-03-31 NOTE — PROGRESS NOTES
Dx: Bladder pain (R39.89)  Pelvic floor tension (N34.3)  Urgency-frequency syndrome (N32.81)             Insurance (Authorized # of Visits):  2/12           Authorizing Physician: Dr. Norma Jones  Next MD visit: none scheduled  Fall Risk: standard         Prec interested in Smoking Cessation program at this time.     The results of the objective tests and measures show 3/5 Transverse Abdominis strength; Spasming of Lower Abdominal Quadrant with Poor Bladder mobility;  L Peroneal Tendon tear and unable to Heel Wa soft tissue mobility allowing for urination and defecation without painful straining.       LTG 6-12 visits     Patient instructed on Levator Ani contraction inverse to diaphragmatic breathing to allow for pelvic brace with ADLs without valsalva.      Deedee and eat 3 servings of soluble fiber/ day. Reduce Bladder Irritants by 1/4. Bladder Mobilization with Supra Pubic caudal lift  Instruction on Bladder Massage with palms and 4/7/8 DB.        HEP:   Self Care: bladder, bowel, and diet diary log and iss

## 2021-04-05 ENCOUNTER — APPOINTMENT (OUTPATIENT)
Dept: PHYSICAL THERAPY | Age: 56
End: 2021-04-05
Attending: OBSTETRICS & GYNECOLOGY
Payer: COMMERCIAL

## 2021-04-07 ENCOUNTER — APPOINTMENT (OUTPATIENT)
Dept: PHYSICAL THERAPY | Age: 56
End: 2021-04-07
Payer: COMMERCIAL

## 2021-04-12 ENCOUNTER — OFFICE VISIT (OUTPATIENT)
Dept: PHYSICAL THERAPY | Age: 56
End: 2021-04-12
Attending: OBSTETRICS & GYNECOLOGY
Payer: COMMERCIAL

## 2021-04-12 PROCEDURE — 97140 MANUAL THERAPY 1/> REGIONS: CPT

## 2021-04-12 PROCEDURE — 97112 NEUROMUSCULAR REEDUCATION: CPT

## 2021-04-12 PROCEDURE — 97535 SELF CARE MNGMENT TRAINING: CPT

## 2021-04-12 NOTE — PROGRESS NOTES
Dx: Bladder pain (R39.89)  Pelvic floor tension (N34.3)  Urgency-frequency syndrome (N32.81)             Insurance (Authorized # of Visits):  2/12           Authorizing Physician: Dr. Norma Jones  Next MD visit: none scheduled  Fall Risk: standard         Prec Cystitis with painful Bladder post Urethral Reconstruction 3 years ago. She is not Sexually active in over two years due to leakage and pain with intercourse.   Sanford Curling reports increased Depression because frequency of urination interrupts her Work schedule irritants, increased water intake to 64 ounces /day, and increased fiber intake to 25g/ day for bowel/ bladder health. MET     Patient instructed on use of step stool to allow for defecation without straining.     Patient instructed on diaphragmatic breathi stabilization training, stretching program; Pt. education for bladder/bowel health, neuroscience principles for pelvic floor rehab, bladder retraining, posture and body mechanics, Functional integration of pelvic floor muscle exercises, Modalities as neede

## 2021-04-12 NOTE — PATIENT INSTRUCTIONS
DIAPHRAGMATIC BREATHING     The diaphragm is a dome shaped muscle that forms the floor of the rib cage. It is the most efficient muscle for breathing and using this muscle aides in relaxation.  Diaphragmatic breathing is an important technique to learn Number of briefs used today              Recommendation of LCSW Mayra Cox [phone]                BLADDER HEALTH      WHAT IS CONSIDERED NORMAL? • The average bladder can hold about 2 cups of urine before it needs to be emptied.   • tea) and citrus foods that you consume as these foods can be associated with increased sensation of urinary urgency and frequency. See the handout [facility] Diet Can Affect Your Bladder” for more information. • Limit the amount of alcohol you drink.  Alcohol in For tea drinkers: Non-citrus herbal Sun brewed tea      2.  Drinking enough and the right kinds of fluids  Many people with bladder control issues decrease their intake of liquids in hope that they will need to urinate less frequently or have less urinary bloating. This can occur if your body is not used to digesting fiber in large amounts. It is also important to drink at least 6-8 cups of water daily to keep the fiber moving through your system.   Below are some tips to help:  • Choose fresh fruits or vege 3.36   Lettuce (raw) ½ cup 0.24   Baked potato w/skin ½ cup 2.97   Spinach ½ cup 2.07   Squash ½ cup 2.87   Tomato (raw) ½ cup 1.17   Zucchini ½ cup 1.26   Beans     Green (canned) ½ cup 1.89   Kidney ½ cup 5.48   Lima ½ cup 4.25   Velasquez ½ cup 5.93   Fresh RELAXATION BREATHING  • Start by lying on your back or reclining in a chair in a relaxed position. Place your hands around the lower portion of your rib cage.   • Relax your jaw by placing your tongue on the roof of your mouth and keeping your teeth slightl necessary to empty the bladder. • An urge is a signal that you feel as the bladder stretches to fill with urine. Urges can be felt even if the bladder is not full. Urges are not commands to go to the toilet, merely a signal and can be controlled.     WHAT incontinent episodes. HOW DIET CAN AFFECT YOUR BLADDER    Although there is no particular \"diet\" that can cure bladder control, there are certain dietary suggestions you can use to help control the problem.     There are 2 points to conside frequently. It also encourages the growth of bacteria, which may lead to infections resulting in incontinence.        Final Thoughts on the BRAT Diet  • The BRAT diet includes bananas, rice, applesauce and toast — all bland foods that are said to be easy o foods that contain both soluble and insoluble fiber.   Insoluble fiber includes foods that are not easily mashable   • Fruits  • Vegetables  • Dried beans  • Wheat bran  • Seeds  • Popcorn  • Brown rice  • Whole grain products such as breads, cereals and pa Watermelon 1 slice 0.73   *For additional information on fiber content in foods go to www.[employer]. [employer]   *Read Nutritional Facts labels on the foods you buy. Product may vary in fiber content.     Fiber Intake Log  Use this log to record your daily fib Repeat. COORDINATING THE PELVIC FLOOR AND BREATHING DIAPHRAGM      Learning to coordinate and contract the pelvic floor with exhalation is a practical technique for bladder control.  It is useful to contract the pelvic floor during exhalation which occurs

## 2021-04-14 ENCOUNTER — OFFICE VISIT (OUTPATIENT)
Dept: PHYSICAL THERAPY | Age: 56
End: 2021-04-14
Attending: INTERNAL MEDICINE
Payer: COMMERCIAL

## 2021-04-14 PROCEDURE — 97112 NEUROMUSCULAR REEDUCATION: CPT

## 2021-04-14 PROCEDURE — 97110 THERAPEUTIC EXERCISES: CPT

## 2021-04-14 PROCEDURE — 97140 MANUAL THERAPY 1/> REGIONS: CPT

## 2021-04-14 NOTE — PATIENT INSTRUCTIONS
DIAPHRAGMATIC BREATHING     The diaphragm is a dome shaped muscle that forms the floor of the rib cage. It is the most efficient muscle for breathing and using this muscle aides in relaxation.  Diaphragmatic breathing is an important technique to learn Number of briefs used today              Recommendation of LCSW Young Mcpherson [phone]                BLADDER HEALTH      WHAT IS CONSIDERED NORMAL? • The average bladder can hold about 2 cups of urine before it needs to be emptied.   • tea) and citrus foods that you consume as these foods can be associated with increased sensation of urinary urgency and frequency. See the handout [facility] Diet Can Affect Your Bladder” for more information. • Limit the amount of alcohol you drink.  Alcohol in For tea drinkers: Non-citrus herbal Sun brewed tea      2.  Drinking enough and the right kinds of fluids  Many people with bladder control issues decrease their intake of liquids in hope that they will need to urinate less frequently or have less urinary bloating. This can occur if your body is not used to digesting fiber in large amounts. It is also important to drink at least 6-8 cups of water daily to keep the fiber moving through your system.   Below are some tips to help:  • Choose fresh fruits or vege 3.36   Lettuce (raw) ½ cup 0.24   Baked potato w/skin ½ cup 2.97   Spinach ½ cup 2.07   Squash ½ cup 2.87   Tomato (raw) ½ cup 1.17   Zucchini ½ cup 1.26   Beans     Green (canned) ½ cup 1.89   Kidney ½ cup 5.48   Lima ½ cup 4.25   Velasquez ½ cup 5.93   Fresh RELAXATION BREATHING  • Start by lying on your back or reclining in a chair in a relaxed position. Place your hands around the lower portion of your rib cage.   • Relax your jaw by placing your tongue on the roof of your mouth and keeping your teeth slightl necessary to empty the bladder. • An urge is a signal that you feel as the bladder stretches to fill with urine. Urges can be felt even if the bladder is not full. Urges are not commands to go to the toilet, merely a signal and can be controlled.     WHAT incontinent episodes. HOW DIET CAN AFFECT YOUR BLADDER    Although there is no particular \"diet\" that can cure bladder control, there are certain dietary suggestions you can use to help control the problem.     There are 2 points to conside frequently. It also encourages the growth of bacteria, which may lead to infections resulting in incontinence.        Final Thoughts on the BRAT Diet  • The BRAT diet includes bananas, rice, applesauce and toast — all bland foods that are said to be easy o foods that contain both soluble and insoluble fiber.   Insoluble fiber includes foods that are not easily mashable   • Fruits  • Vegetables  • Dried beans  • Wheat bran  • Seeds  • Popcorn  • Brown rice  • Whole grain products such as breads, cereals and pa Watermelon 1 slice 3.09   *For additional information on fiber content in foods go to www.[employer]. [employer]   *Read Nutritional Facts labels on the foods you buy. Product may vary in fiber content.     Fiber Intake Log  Use this log to record your daily fib Repeat. COORDINATING THE PELVIC FLOOR AND BREATHING DIAPHRAGM      Learning to coordinate and contract the pelvic floor with exhalation is a practical technique for bladder control.  It is useful to contract the pelvic floor during exhalation which occurs

## 2021-04-14 NOTE — PROGRESS NOTES
Dx: Bladder pain (R39.89)  Pelvic floor tension (N34.3)  Urgency-frequency syndrome (N32.81)             Insurance (Authorized # of Visits):  3/12           Authorizing Physician: Dr. Sobia Ontiveros Next MD visit: none scheduled  Fall Risk: standard         Preca Social activities due to Urinary Incontinence. (Referred patient to Lydia Vázquez LCSW.)  She has Bowel Movement 2x/ day with [facility] #3-4.   Estelle Hardy also smokes 1-1.5 packs of cigarettes / day and discussed smoking causing bladder irritation with increase parasympathetic nervous stimulation and to allow for increased intra abdominal pressure with defecation.     Patient instructed on Levator Ani/ Transverse Abdominis (Pelvic Brace) contraction to prevent Urinary incontinence with ADLs.        Patient exhibit ultrasound and e-stimulation), HEP instruction and progression. Date: 3/31/2021  TX#: 2/12 Date:   4/12/2021              TX#: 3/12 Date:   4/14/2021              TX#: 4/12 Date:                 TX#: 5/ Date:    Tx#: 6/   Review of log 4/7/8 DB TM 1' but B

## 2021-04-19 ENCOUNTER — TELEPHONE (OUTPATIENT)
Dept: PHYSICAL THERAPY | Facility: HOSPITAL | Age: 56
End: 2021-04-19

## 2021-04-19 ENCOUNTER — APPOINTMENT (OUTPATIENT)
Dept: PHYSICAL THERAPY | Age: 56
End: 2021-04-19
Payer: COMMERCIAL

## 2021-04-20 ENCOUNTER — TELEPHONE (OUTPATIENT)
Dept: PHYSICAL THERAPY | Facility: HOSPITAL | Age: 56
End: 2021-04-20

## 2021-04-21 ENCOUNTER — APPOINTMENT (OUTPATIENT)
Dept: PHYSICAL THERAPY | Age: 56
End: 2021-04-21
Payer: COMMERCIAL

## 2021-05-05 ENCOUNTER — OFFICE VISIT (OUTPATIENT)
Dept: PHYSICAL THERAPY | Age: 56
End: 2021-05-05
Attending: INTERNAL MEDICINE
Payer: COMMERCIAL

## 2021-05-05 PROCEDURE — 97112 NEUROMUSCULAR REEDUCATION: CPT

## 2021-05-05 PROCEDURE — 97535 SELF CARE MNGMENT TRAINING: CPT

## 2021-05-05 PROCEDURE — 97110 THERAPEUTIC EXERCISES: CPT

## 2021-05-05 NOTE — PATIENT INSTRUCTIONS
DIAPHRAGMATIC BREATHING     The diaphragm is a dome shaped muscle that forms the floor of the rib cage. It is the most efficient muscle for breathing and using this muscle aides in relaxation.  Diaphragmatic breathing is an important technique to learn Number of briefs used today              Recommendation of LCSW Arnoldo Paulino [phone]                BLADDER HEALTH      WHAT IS CONSIDERED NORMAL? • The average bladder can hold about 2 cups of urine before it needs to be emptied.   • tea) and citrus foods that you consume as these foods can be associated with increased sensation of urinary urgency and frequency. See the handout [facility] Diet Can Affect Your Bladder” for more information. • Limit the amount of alcohol you drink.  Alcohol in For tea drinkers: Non-citrus herbal Sun brewed tea      2.  Drinking enough and the right kinds of fluids  Many people with bladder control issues decrease their intake of liquids in hope that they will need to urinate less frequently or have less urinary bloating. This can occur if your body is not used to digesting fiber in large amounts. It is also important to drink at least 6-8 cups of water daily to keep the fiber moving through your system.   Below are some tips to help:  • Choose fresh fruits or vege 3.36   Lettuce (raw) ½ cup 0.24   Baked potato w/skin ½ cup 2.97   Spinach ½ cup 2.07   Squash ½ cup 2.87   Tomato (raw) ½ cup 1.17   Zucchini ½ cup 1.26   Beans     Green (canned) ½ cup 1.89   Kidney ½ cup 5.48   Lima ½ cup 4.25   Velasquez ½ cup 5.93   Fresh RELAXATION BREATHING  • Start by lying on your back or reclining in a chair in a relaxed position. Place your hands around the lower portion of your rib cage.   • Relax your jaw by placing your tongue on the roof of your mouth and keeping your teeth slightl necessary to empty the bladder. • An urge is a signal that you feel as the bladder stretches to fill with urine. Urges can be felt even if the bladder is not full. Urges are not commands to go to the toilet, merely a signal and can be controlled.     WHAT incontinent episodes. HOW DIET CAN AFFECT YOUR BLADDER    Although there is no particular \"diet\" that can cure bladder control, there are certain dietary suggestions you can use to help control the problem.     There are 2 points to conside frequently. It also encourages the growth of bacteria, which may lead to infections resulting in incontinence.        Final Thoughts on the BRAT Diet  • The BRAT diet includes bananas, rice, applesauce and toast — all bland foods that are said to be easy o foods that contain both soluble and insoluble fiber.   Insoluble fiber includes foods that are not easily mashable   • Fruits  • Vegetables  • Dried beans  • Wheat bran  • Seeds  • Popcorn  • Brown rice  • Whole grain products such as breads, cereals and pa Watermelon 1 slice 2.19   *For additional information on fiber content in foods go to www.[employer]. [employer]   *Read Nutritional Facts labels on the foods you buy. Product may vary in fiber content.     Fiber Intake Log  Use this log to record your daily fib Repeat. COORDINATING THE PELVIC FLOOR AND BREATHING DIAPHRAGM      Learning to coordinate and contract the pelvic floor with exhalation is a practical technique for bladder control.  It is useful to contract the pelvic floor during exhalation which occurs reclining in a chair in a relaxed position. Place your hands around the lower portion of your rib cage. • Relax your jaw by placing your tongue on the roof of your mouth and keeping your teeth slightly apart.    • Take a deep breath in for 4 count through you feel as the bladder stretches to fill with urine. Urges can be felt even if the bladder is not full. Urges are not commands to go to the toilet, merely a signal and can be controlled. WHAT ARE GOOD BLADDER HABITS?   • Take your time when emptying you BLADDER    Although there is no particular \"diet\" that can cure bladder control, there are certain dietary suggestions you can use to help control the problem.     There are 2 points to consider when evaluating how your habits and diet may affect your loan may lead to infections resulting in incontinence. Final Thoughts on the BRAT Diet  • The BRAT diet includes bananas, rice, applesauce and toast — all bland foods that are said to be easy on the digestive system.    • BRAT diet foods may be helpful for fiber includes foods that are not easily mashable   • Fruits  • Vegetables  • Dried beans  • Wheat bran  • Seeds  • Popcorn  • Brown rice  • Whole grain products such as breads, cereals and pasta    Soluble fiber includes foods that are smooth and low resi content in foods go to www.[employer]. [employer]   *Read Nutritional Facts labels on the foods you buy. Product may vary in fiber content. Fiber Intake Log  Use this log to record your daily fiber intake.    Meal Food Items Grams of Fiber   Breakfast Practice this for _10__minutes.                             DAILY VOIDING LOG    Name_________________________         Date_____________    Time of Day     Type & Amount  of Food & Fluid Intake Amount Voided   S /M /L or  Seconds Amount of  Leakage  S /M /L “just in case” or more often than every 2 hours. It is usually not necessary to go when you feel the first urge. Try to go only when your bladder is full.  Urgency and frequency of urination can be improved by retraining the bladder and spacing your fluid i completely, it is recommended that you use the following items in moderation:    • Foods with acidic properties:  • Alcoholic beverages  • Tomato based products  • Vinegar  • Coffee (regular and decaf)  • Tea (regular and decaf)  • Ilene Newer  • Spicy foods  • diet, these anti-diarrhea foods can help relieve symptoms and improve digestion. • If the BRAT diet or other foods for diarrhea don’t work after four to five days, it’s time to see your health care provider.                 FIBER FACTS    To maintain prop whole-grain flours. Grams of Fiber in [employer] *  Food Products Serving Size Grams of Fiber per serving   Breads     Whole Wheat 1 slice 6.02   White 1 slice 0.89   Rye 1 slice 4.69   Cereals     Oat Bran 1 oz.  4.06   Wheat Bran 1 oz. 10.0   All Instruction on Bladder Massage with palms and 4/7/8 DB. INSTRUCTIONS FOR CONTROLLING URINARY URGE      WHEN YOU EXPERIENCE A STRONG URGE TO URINATE:    FIRST Stop activity, stand quietly or sit down. Try to stay very still to maintain control.  Av lungs.              • Start by lying on your back or reclining in a chair in a relaxed position. Place one hand on your chest and the other on your belly.    • Relax your jaw by placing your tongue on the roof of your mouth so that your teeth are not touchi have to interrupt the schedule, get back on schedule at the assigned time for the next void. RECORDING ON THE VOIDING LOG  • Record your urination in the voiding log (in the amount voided section) by placing a check corresponding to the time of day.    • concentrate on the program, feel nervous or tense, are sensitive to cold weather or the sound of running water, or are about to start your menstrual period.     If all of the above techniques fail and you still have to an overwhelming urge to go, you may us

## 2021-05-05 NOTE — PROGRESS NOTES
Discharge Summary    Dx: Bladder pain (R39.89)  Pelvic floor tension (N34.3)  Urgency-frequency syndrome (N32.81)             Insurance (Authorized # of Visits):  5/12           Authorizing Physician: Dr. Andre Browning Next MD visit: none scheduled  Fall Risk: s health. MET     Patient instructed on use of step stool to allow for defecation without straining. MET     Patient instructed on diaphragmatic breathing for parasympathetic nervous stimulation and to allow for increased intra abdominal pressure with defecati Drinking 40 oz of water and eat 3 servings of fiber/ day Dilator/ Vibrator Recommendation to Relax PFM    Drink 60 oz of water, and eat 3 servings of soluble fiber/ day. Reduce Bladder Irritants by 1/4.   Bladder Health with reduction in Bladder Irritant

## 2021-05-26 ENCOUNTER — APPOINTMENT (OUTPATIENT)
Dept: PHYSICAL THERAPY | Age: 56
End: 2021-05-26
Attending: INTERNAL MEDICINE
Payer: COMMERCIAL

## 2021-06-28 ENCOUNTER — HOSPITAL ENCOUNTER (OUTPATIENT)
Dept: MRI IMAGING | Age: 56
Discharge: HOME | End: 2021-06-28
Attending: INTERNAL MEDICINE
Payer: COMMERCIAL

## 2021-06-28 DIAGNOSIS — R53.1 WEAKNESS: ICD-10-CM

## 2021-06-28 DIAGNOSIS — M62.81 MUSCLE WEAKNESS (GENERALIZED): ICD-10-CM

## 2021-06-28 DIAGNOSIS — R39.81 FUNCTIONAL URINARY INCONTINENCE: ICD-10-CM

## 2021-06-28 DIAGNOSIS — N39.498 OTHER SPECIFIED URINARY INCONTINENCE: ICD-10-CM

## 2021-06-28 DIAGNOSIS — R20.2 PARESTHESIA OF SKIN: ICD-10-CM

## 2021-06-28 PROCEDURE — 72195 MRI PELVIS W/O DYE: CPT | Performed by: INTERNAL MEDICINE

## 2021-06-28 PROCEDURE — 72146 MRI CHEST SPINE W/O DYE: CPT | Performed by: INTERNAL MEDICINE

## 2021-06-28 PROCEDURE — 70551 MRI BRAIN STEM W/O DYE: CPT | Performed by: INTERNAL MEDICINE

## 2021-06-28 PROCEDURE — 72148 MRI LUMBAR SPINE W/O DYE: CPT | Performed by: INTERNAL MEDICINE

## 2021-06-28 PROCEDURE — 72141 MRI NECK SPINE W/O DYE: CPT | Performed by: INTERNAL MEDICINE

## 2021-07-06 ENCOUNTER — OFFICE VISIT (OUTPATIENT)
Dept: SURGERY | Facility: CLINIC | Age: 56
End: 2021-07-06
Payer: COMMERCIAL

## 2021-07-06 VITALS — DIASTOLIC BLOOD PRESSURE: 60 MMHG | SYSTOLIC BLOOD PRESSURE: 98 MMHG | HEART RATE: 72 BPM

## 2021-07-06 DIAGNOSIS — R29.898 WEAKNESS OF BOTH UPPER EXTREMITIES: ICD-10-CM

## 2021-07-06 DIAGNOSIS — R15.2 RECTAL URGENCY: ICD-10-CM

## 2021-07-06 DIAGNOSIS — R29.898 WEAKNESS OF BOTH LOWER EXTREMITIES: ICD-10-CM

## 2021-07-06 DIAGNOSIS — G95.9 CERVICAL MYELOPATHY WITH CERVICAL RADICULOPATHY (HCC): ICD-10-CM

## 2021-07-06 DIAGNOSIS — M54.12 CERVICAL MYELOPATHY WITH CERVICAL RADICULOPATHY (HCC): ICD-10-CM

## 2021-07-06 DIAGNOSIS — G95.0 SYRINX OF SPINAL CORD (HCC): Primary | ICD-10-CM

## 2021-07-06 DIAGNOSIS — R26.9 NEUROLOGIC GAIT DYSFUNCTION: ICD-10-CM

## 2021-07-06 DIAGNOSIS — R32 URINARY INCONTINENCE, UNSPECIFIED TYPE: ICD-10-CM

## 2021-07-06 PROCEDURE — 3074F SYST BP LT 130 MM HG: CPT | Performed by: PHYSICIAN ASSISTANT

## 2021-07-06 PROCEDURE — 3078F DIAST BP <80 MM HG: CPT | Performed by: PHYSICIAN ASSISTANT

## 2021-07-06 PROCEDURE — 99215 OFFICE O/P EST HI 40 MIN: CPT | Performed by: PHYSICIAN ASSISTANT

## 2021-07-06 NOTE — PROGRESS NOTES
[facility] Neurosurgery Consultation      HISTORY OF PRESENT ILLNESS:Alannah Elias is a 48year old RH female here for neurosurgical eval for a thoracic syrinx. She has a history of developing numbness and tingling in the arms and legs with weakness.   She depression    • IBS (irritable bowel syndrome)    • Internal hemorrhoid     burst x2   • Irregular bowel habits    • Itch of skin    • Leaking of urine    • Night sweats    • Pain in joints    • Pain with bowel movements    • Painful swallowing 2019   • Pa Comment:blisters  Dilaudid [[employer]*    OTHER (SEE COMMENTS)    Comment:Temporary paralysis of left side    REVIEW OF SYSTEMS:  A 10-point system was reviewed. Pertinent positives and negatives are noted in HPI.       PHYSICAL EXAMINATION:  GENERAL:  Joe Doing minimal amount of punctate T2 hyperintense signal within the periventricular and subcortical white matter which is mildly worse since 9/15/2015. Lucie Dragon is   no restricted diffusion to suggest acute ischemia/infarction.     There is a moderate amount of flui abnormality, spinal stenosis, or foraminal narrowing.       Unremarkable paraspinous region with no visible mass.      MRI of the lumbar spine 6/28/2021  LUMBAR DISC LEVELS   L1-L2:  No significant disc/facet abnormality, spinal stenosis, or foraminal narro

## 2021-07-12 ENCOUNTER — HOSPITAL ENCOUNTER (OUTPATIENT)
Dept: GENERAL RADIOLOGY | Age: 56
Discharge: HOME | End: 2021-07-12
Attending: PHYSICIAN ASSISTANT
Payer: COMMERCIAL

## 2021-07-12 ENCOUNTER — HOSPITAL ENCOUNTER (OUTPATIENT)
Dept: MRI IMAGING | Age: 56
Discharge: HOME | End: 2021-07-12
Attending: PHYSICIAN ASSISTANT
Payer: COMMERCIAL

## 2021-07-12 DIAGNOSIS — R32 URINARY INCONTINENCE, UNSPECIFIED TYPE: ICD-10-CM

## 2021-07-12 DIAGNOSIS — R26.9 NEUROLOGIC GAIT DYSFUNCTION: ICD-10-CM

## 2021-07-12 DIAGNOSIS — G95.0 SYRINX OF SPINAL CORD (HCC): ICD-10-CM

## 2021-07-12 DIAGNOSIS — R15.2 RECTAL URGENCY: ICD-10-CM

## 2021-07-12 DIAGNOSIS — M54.12 CERVICAL MYELOPATHY WITH CERVICAL RADICULOPATHY (HCC): ICD-10-CM

## 2021-07-12 DIAGNOSIS — R29.898 WEAKNESS OF BOTH LOWER EXTREMITIES: ICD-10-CM

## 2021-07-12 DIAGNOSIS — G95.9 CERVICAL MYELOPATHY WITH CERVICAL RADICULOPATHY (HCC): ICD-10-CM

## 2021-07-12 DIAGNOSIS — R29.898 WEAKNESS OF BOTH UPPER EXTREMITIES: ICD-10-CM

## 2021-07-12 PROCEDURE — 72157 MRI CHEST SPINE W/O & W/DYE: CPT | Performed by: PHYSICIAN ASSISTANT

## 2021-07-12 PROCEDURE — 72052 X-RAY EXAM NECK SPINE 6/>VWS: CPT | Performed by: PHYSICIAN ASSISTANT

## 2021-07-12 PROCEDURE — A9575 INJ GADOTERATE MEGLUMI 0.1ML: HCPCS | Performed by: PHYSICIAN ASSISTANT

## 2021-07-14 ENCOUNTER — OFFICE VISIT (OUTPATIENT)
Dept: SURGERY | Facility: CLINIC | Age: 56
End: 2021-07-14
Payer: COMMERCIAL

## 2021-07-14 ENCOUNTER — TELEPHONE (OUTPATIENT)
Dept: SURGERY | Facility: CLINIC | Age: 56
End: 2021-07-14

## 2021-07-14 VITALS
SYSTOLIC BLOOD PRESSURE: 100 MMHG | DIASTOLIC BLOOD PRESSURE: 64 MMHG | WEIGHT: 154 LBS | HEIGHT: 68 IN | BODY MASS INDEX: 23.34 KG/M2

## 2021-07-14 DIAGNOSIS — G95.0 SYRINX OF SPINAL CORD (HCC): ICD-10-CM

## 2021-07-14 DIAGNOSIS — M47.12 CERVICAL SPONDYLOSIS WITH MYELOPATHY: Primary | ICD-10-CM

## 2021-07-14 DIAGNOSIS — M54.12 CERVICAL MYELOPATHY WITH CERVICAL RADICULOPATHY (HCC): Primary | ICD-10-CM

## 2021-07-14 DIAGNOSIS — G95.9 CERVICAL MYELOPATHY WITH CERVICAL RADICULOPATHY (HCC): Primary | ICD-10-CM

## 2021-07-14 DIAGNOSIS — R32 URINARY INCONTINENCE, UNSPECIFIED TYPE: ICD-10-CM

## 2021-07-14 PROCEDURE — 99214 OFFICE O/P EST MOD 30 MIN: CPT | Performed by: NEUROLOGICAL SURGERY

## 2021-07-14 PROCEDURE — 3074F SYST BP LT 130 MM HG: CPT | Performed by: NEUROLOGICAL SURGERY

## 2021-07-14 PROCEDURE — 3078F DIAST BP <80 MM HG: CPT | Performed by: NEUROLOGICAL SURGERY

## 2021-07-14 PROCEDURE — 3008F BODY MASS INDEX DOCD: CPT | Performed by: NEUROLOGICAL SURGERY

## 2021-07-14 NOTE — TELEPHONE ENCOUNTER
You are scheduled for ANTERIOR SPINAL FUSION 2 LEVEL cervical five-six and cervical six-seven on 8/5/21 with Dr. Roosevelt Jain. · You will need to contact the Pre-admission department at [phone] to schedule your pre-op testing.   They will get you schedul need an Aspen cervical collar. · You may need an external bone growth stimulator. If ordered for your surgery SEBASTIAN Diaz will contact you either before or after your surgery. For any questions you may reach Dakota Diaz at phone #: [phone].     · You ma surgery protect you from getting an infection after surgery   · Hibiclens comes in a large blue bottle and can be found in most pharmacies in the First Aid supplies  · Shower with this daily for FIVE consecutive days before surgery, using the entire bottle

## 2021-07-14 NOTE — TELEPHONE ENCOUNTER
Patient is scheduled for ANTERIOR SPINAL FUSION 2 LEVEL cervical five-six and cervical six-seven on 8/5/21 with Dr Vasyl Taylor.     Y  form completed  Y Surgery order signed   Liliana Jhaveri on surgery sheet  Y Placed on outlook calendar  Y WOWashhart message

## 2021-07-14 NOTE — PROGRESS NOTES
Feeling about the same no change  Right foot pinky toe, burning, pain, forgot to mention this last time  Sees to be getting worse

## 2021-07-14 NOTE — PROGRESS NOTES
[facility]  Neurological Surgery Clinic Note    Name: Blaise Cody  : 1965  MRN: [id number]  PCP: Anjana Sellers MD    CHIEF COMPLAINT:  numbness and tingling in the arms and legs with weakness    HISTORY OF PRESENT ILLNESS: lordosis. No significant abnormal motion with flexion or extension views.  No high-grade neural foraminal narrowing.  C1-2 articulation intact.  Normal mineralization.  Unremarkable soft tissues. Cervical, Thoracic, and Lumbar MRI's dated 6/28/2021:  1. production    • Stress    • Tooth infection    • Vertigo    • Weight gain        PAST SURGICAL HISTORY:  Past Surgical History:   Procedure Laterality Date   • ADENOIDECTOMY  1979    done with tonsillectomy   • CHOLECYSTECTOMY  2019   • COLONOSCOPY  2001 Patient Position: Sitting, Cuff Size: adult)   Ht 68\"   Wt 154 lb (69.9 kg)   BMI 23.42 kg/m²   GENERAL:  Well developed, well nourished, patient in no acute distress. HEENT:  Normocephalic, atraumatic. Neck supple. No JVD. No lymphadenopathy.   No bru and fusion at this time (C5-6 and C6-7 ACDF with a zero profile construct). An anterior cervical discectomy with fusion (ACDF) is a common surgical procedure to treat damaged cervical discs.  Its goal is to relieve pressure on the nerve roots or on the spin ways to speed up recovery. 2.   There is a 50% chance patients feel significantly better after surgery, 25% chance they feel exactly the same way after surgery, and unfortunately 25% patients feel worse after surgery.   Modifiable risk factors associate Cliff Lee MD, [facility]  Board Certified Neurological Surgeon  [facility] [address]  [address], [address] [facility] [place] Coles, [address]

## 2021-07-14 NOTE — TELEPHONE ENCOUNTER
Pt very upset, crying, during scheduling process d/t the pt not being able to have surgery done sooner.  Pt states that she will lose her job if we can not get her in soon and goes onto say the last surgery she had done she was approved before she left that

## 2021-07-15 ENCOUNTER — TELEPHONE (OUTPATIENT)
Dept: SURGERY | Facility: CLINIC | Age: 56
End: 2021-07-15

## 2021-07-15 NOTE — TELEPHONE ENCOUNTER
Patient unable to have surgery on 11/19 due to no being eligible for leave of absence with work. Was also informed that any time she misses work she will loose her insurance.   Patient wondering if she can schedule for May 2021 end of Month with Dr. Tom Hernandez

## 2021-07-15 NOTE — TELEPHONE ENCOUNTER
Pt would like to know the expected recovery time after surgery and when she would be able to return to work. Pt also asking if surgery can be r/s to November.  Pls call pt to discuss

## 2021-07-15 NOTE — TELEPHONE ENCOUNTER
Called and spoke to patient. Discussed recovery time post surgery. Explained to pt that recovery is very individualized and varies from person to person. Explained to pt she should not drive for 2 weeks and reviewed restrictions for after surgery.     Pt

## 2021-07-15 NOTE — TELEPHONE ENCOUNTER
Patient is re-scheduled for ANTERIOR SPINAL FUSION 2 LEVEL cervical five-six and cervical six-seven on 11/18/21 with Dr Greta Soni.     Y-Surgery case change request signed   Y-Placed sx on surgery sheet  Y-Placed on outlook calendar  Y-[employer] message sent to

## 2021-07-16 NOTE — TELEPHONE ENCOUNTER
Received message from pt stating she wanted to cancel. Sent [facility] to verify if she wants to cancel completely or if she wants to postpone to May 2022.

## 2021-07-19 NOTE — TELEPHONE ENCOUNTER
Pt wants to postpone surgery until May or June of next year. Surgery order cancelled. Pt reminder placed to appear January 2022 to call and schedule.

## 2022-02-07 ENCOUNTER — HOSPITAL ENCOUNTER (EMERGENCY)
Age: 57
Discharge: HOME | End: 2022-02-07
Attending: EMERGENCY MEDICINE
Payer: COMMERCIAL

## 2022-02-07 ENCOUNTER — APPOINTMENT (OUTPATIENT)
Dept: CT IMAGING | Age: 57
End: 2022-02-07
Attending: EMERGENCY MEDICINE
Payer: COMMERCIAL

## 2022-02-07 VITALS
HEIGHT: 68 IN | WEIGHT: 150 LBS | RESPIRATION RATE: 17 BRPM | BODY MASS INDEX: 22.73 KG/M2 | TEMPERATURE: 98 F | SYSTOLIC BLOOD PRESSURE: 95 MMHG | HEART RATE: 56 BPM | OXYGEN SATURATION: 100 % | DIASTOLIC BLOOD PRESSURE: 68 MMHG

## 2022-02-07 DIAGNOSIS — R10.9 ABDOMINAL PAIN OF UNKNOWN ETIOLOGY: Primary | ICD-10-CM

## 2022-02-07 LAB
ALBUMIN SERPL-MCNC: 4 G/DL (ref 3.4–5)
ALBUMIN/GLOB SERPL: 1.3 {RATIO} (ref 1–2)
ALP LIVER SERPL-CCNC: 93 U/L
ALT SERPL-CCNC: 14 U/L
ANION GAP SERPL CALC-SCNC: 4 MMOL/L (ref 0–18)
AST SERPL-CCNC: 11 U/L (ref 15–37)
BASOPHILS # BLD AUTO: 0.06 X10(3) UL (ref 0–0.2)
BASOPHILS NFR BLD AUTO: 0.8 %
BILIRUB SERPL-MCNC: 0.5 MG/DL (ref 0.1–2)
BILIRUB UR QL STRIP.AUTO: NEGATIVE
BUN BLD-MCNC: 10 MG/DL (ref 7–18)
CALCIUM BLD-MCNC: 9 MG/DL (ref 8.5–10.1)
CHLORIDE SERPL-SCNC: 110 MMOL/L (ref 98–112)
CLARITY UR REFRACT.AUTO: CLEAR
CO2 SERPL-SCNC: 26 MMOL/L (ref 21–32)
COLOR UR AUTO: YELLOW
CREAT BLD-MCNC: 0.83 MG/DL
EOSINOPHIL # BLD AUTO: 0.39 X10(3) UL (ref 0–0.7)
EOSINOPHIL NFR BLD AUTO: 5.5 %
ERYTHROCYTE [DISTWIDTH] IN BLOOD BY AUTOMATED COUNT: 12.5 %
GLOBULIN PLAS-MCNC: 3.1 G/DL (ref 2.8–4.4)
GLUCOSE BLD-MCNC: 95 MG/DL (ref 70–99)
GLUCOSE UR STRIP.AUTO-MCNC: NEGATIVE MG/DL
HCT VFR BLD AUTO: 42 %
HGB BLD-MCNC: 14 G/DL
IMM GRANULOCYTES # BLD AUTO: 0.01 X10(3) UL (ref 0–1)
IMM GRANULOCYTES NFR BLD: 0.1 %
KETONES UR STRIP.AUTO-MCNC: NEGATIVE MG/DL
LEUKOCYTE ESTERASE UR QL STRIP.AUTO: NEGATIVE
LYMPHOCYTES # BLD AUTO: 2.26 X10(3) UL (ref 1–4)
LYMPHOCYTES NFR BLD AUTO: 31.7 %
MCH RBC QN AUTO: 30.2 PG (ref 26–34)
MCHC RBC AUTO-ENTMCNC: 33.3 G/DL (ref 31–37)
MCV RBC AUTO: 90.5 FL
MONOCYTES # BLD AUTO: 0.66 X10(3) UL (ref 0.1–1)
MONOCYTES NFR BLD AUTO: 9.2 %
NEUTROPHILS # BLD AUTO: 3.76 X10 (3) UL (ref 1.5–7.7)
NEUTROPHILS # BLD AUTO: 3.76 X10(3) UL (ref 1.5–7.7)
NEUTROPHILS NFR BLD AUTO: 52.7 %
NITRITE UR QL STRIP.AUTO: NEGATIVE
OSMOLALITY SERPL CALC.SUM OF ELEC: 289 MOSM/KG (ref 275–295)
PH UR STRIP.AUTO: 6.5 [PH] (ref 5–8)
PLATELET # BLD AUTO: 247 10(3)UL (ref 150–450)
POTASSIUM SERPL-SCNC: 4.2 MMOL/L (ref 3.5–5.1)
PROT SERPL-MCNC: 7.1 G/DL (ref 6.4–8.2)
PROT UR STRIP.AUTO-MCNC: NEGATIVE MG/DL
RBC # BLD AUTO: 4.64 X10(6)UL
RBC UR QL AUTO: NEGATIVE
SODIUM SERPL-SCNC: 140 MMOL/L (ref 136–145)
SP GR UR STRIP.AUTO: >=1.03 (ref 1–1.03)
UROBILINOGEN UR STRIP.AUTO-MCNC: 0.2 MG/DL
WBC # BLD AUTO: 7.1 X10(3) UL (ref 4–11)

## 2022-02-07 PROCEDURE — 74177 CT ABD & PELVIS W/CONTRAST: CPT | Performed by: EMERGENCY MEDICINE

## 2022-02-07 PROCEDURE — 99284 EMERGENCY DEPT VISIT MOD MDM: CPT

## 2022-02-07 PROCEDURE — 96361 HYDRATE IV INFUSION ADD-ON: CPT

## 2022-02-07 PROCEDURE — 81003 URINALYSIS AUTO W/O SCOPE: CPT | Performed by: EMERGENCY MEDICINE

## 2022-02-07 PROCEDURE — 80053 COMPREHEN METABOLIC PANEL: CPT | Performed by: EMERGENCY MEDICINE

## 2022-02-07 PROCEDURE — 85025 COMPLETE CBC W/AUTO DIFF WBC: CPT | Performed by: EMERGENCY MEDICINE

## 2022-02-07 PROCEDURE — 96374 THER/PROPH/DIAG INJ IV PUSH: CPT

## 2022-02-07 RX ORDER — TRAMADOL HYDROCHLORIDE 50 MG/1
TABLET ORAL EVERY 6 HOURS PRN
Qty: 10 TABLET | Refills: 0 | Status: SHIPPED | OUTPATIENT
Start: 2022-02-07 | End: 2022-02-12

## 2022-02-07 RX ORDER — KETOROLAC TROMETHAMINE 30 MG/ML
30 INJECTION, SOLUTION INTRAMUSCULAR; INTRAVENOUS ONCE
Status: COMPLETED | OUTPATIENT
Start: 2022-02-07 | End: 2022-02-07

## 2022-02-07 RX ORDER — IOHEXOL 350 MG/ML
80 INJECTION, SOLUTION INTRAVENOUS
Status: COMPLETED | OUTPATIENT
Start: 2022-02-07 | End: 2022-02-07

## 2022-02-07 RX ORDER — SODIUM CHLORIDE 9 MG/ML
125 INJECTION, SOLUTION INTRAVENOUS CONTINUOUS
Status: DISCONTINUED | OUTPATIENT
Start: 2022-02-07 | End: 2022-02-07

## 2022-05-17 ENCOUNTER — HOSPITAL ENCOUNTER (OUTPATIENT)
Dept: GENERAL RADIOLOGY | Age: 57
Discharge: HOME | End: 2022-05-17
Attending: PHYSICIAN ASSISTANT

## 2022-05-17 ENCOUNTER — OCC HEALTH (OUTPATIENT)
Dept: OCCUPATIONAL MEDICINE | Age: 57
End: 2022-05-17
Attending: PHYSICIAN ASSISTANT

## 2022-05-17 DIAGNOSIS — S50.01XA CONTUSION OF RIGHT ELBOW, INITIAL ENCOUNTER: ICD-10-CM

## 2022-05-17 DIAGNOSIS — S50.01XA CONTUSION OF RIGHT ELBOW, INITIAL ENCOUNTER: Primary | ICD-10-CM

## 2022-05-17 PROCEDURE — 73080 X-RAY EXAM OF ELBOW: CPT | Performed by: PHYSICIAN ASSISTANT

## 2022-05-31 ENCOUNTER — HOSPITAL ENCOUNTER (OUTPATIENT)
Dept: GENERAL RADIOLOGY | Age: 57
Discharge: HOME | End: 2022-05-31
Attending: PHYSICIAN ASSISTANT

## 2022-05-31 ENCOUNTER — OFFICE VISIT (OUTPATIENT)
Dept: OCCUPATIONAL MEDICINE | Age: 57
End: 2022-05-31
Attending: PHYSICIAN ASSISTANT

## 2022-05-31 DIAGNOSIS — S50.01XD CONTUSION OF RIGHT ELBOW, SUBSEQUENT ENCOUNTER: ICD-10-CM

## 2022-05-31 DIAGNOSIS — S50.01XD CONTUSION OF RIGHT ELBOW, SUBSEQUENT ENCOUNTER: Primary | ICD-10-CM

## 2022-05-31 PROCEDURE — 73080 X-RAY EXAM OF ELBOW: CPT | Performed by: PHYSICIAN ASSISTANT

## 2022-06-08 ENCOUNTER — TELEPHONE (OUTPATIENT)
Dept: ORTHOPEDICS CLINIC | Facility: CLINIC | Age: 57
End: 2022-06-08

## 2022-06-08 NOTE — TELEPHONE ENCOUNTER
Patient is scheduled with Rodney Richey for a right elbow injury. Please advise if imaging is needed.

## 2022-06-08 NOTE — TELEPHONE ENCOUNTER
Last Imaging  XR ELBOW, COMPLETE (MIN 3 VIEWS), RIGHT (CPT=73080)  Narrative: PROCEDURE:  XR ELBOW, COMPLETE (MIN 3 VIEWS), RIGHT (CPT=73080)     TECHNIQUE:  Three views were obtained. COMPARISON:  SHERIN, XR, XR ELBOW, COMPLETE (MIN 3 VIEWS), RIGHT (CPT=73080), 5/17/2022, 11:47 AM.     INDICATIONS:  S50.01XD Contusion of right elbow, subsequent encounter     PATIENT STATED HISTORY: (As transcribed by Technologist)  Patient injured right elbow two weeks ago and still has pain to posterior and lateral elbow with radiating pain up forearm with finger movements. FINDINGS:    BONES:  Normal.  No significant arthropathy or acute abnormality. SOFT TISSUES:  Negative. No visible soft tissue swelling. EFFUSION:  None visible.   OTHER:  Stable appearance since the prior exam.                   Impression: CONCLUSION:  Negative        Dictated by (CST): Juan Bhatia MD on 5/31/2022 at 1:07 PM       Finalized by (CST): Juan Bhatia MD on 5/31/2022 at 1:08 PM        Future Appointments   Date Time Provider Edilberto Ochoa   6/14/2022  8:20 AM JONNA Sharpe [facility]

## 2022-06-14 ENCOUNTER — OFFICE VISIT (OUTPATIENT)
Dept: ORTHOPEDICS CLINIC | Facility: CLINIC | Age: 57
End: 2022-06-14
Payer: OTHER MISCELLANEOUS

## 2022-06-14 VITALS — OXYGEN SATURATION: 98 % | HEART RATE: 82 BPM

## 2022-06-14 DIAGNOSIS — M25.521 RIGHT ELBOW PAIN: Primary | ICD-10-CM

## 2022-06-14 PROCEDURE — 99203 OFFICE O/P NEW LOW 30 MIN: CPT | Performed by: ORTHOPAEDIC SURGERY

## 2022-06-21 ENCOUNTER — HOSPITAL ENCOUNTER (OUTPATIENT)
Dept: MRI IMAGING | Age: 57
Discharge: HOME | End: 2022-06-21
Attending: ORTHOPAEDIC SURGERY
Payer: OTHER MISCELLANEOUS

## 2022-06-21 DIAGNOSIS — M25.521 RIGHT ELBOW PAIN: ICD-10-CM

## 2022-06-21 PROCEDURE — 73221 MRI JOINT UPR EXTREM W/O DYE: CPT | Performed by: ORTHOPAEDIC SURGERY

## 2022-06-23 ENCOUNTER — OFFICE VISIT (OUTPATIENT)
Dept: ORTHOPEDICS CLINIC | Facility: CLINIC | Age: 57
End: 2022-06-23
Payer: OTHER MISCELLANEOUS

## 2022-06-23 ENCOUNTER — TELEPHONE (OUTPATIENT)
Dept: ORTHOPEDICS CLINIC | Facility: CLINIC | Age: 57
End: 2022-06-23

## 2022-06-23 VITALS — HEIGHT: 68 IN | BODY MASS INDEX: 23.49 KG/M2 | WEIGHT: 155 LBS

## 2022-06-23 DIAGNOSIS — S56.519A PARTIAL TEAR OF COMMON EXTENSOR TENDON OF ELBOW: Primary | ICD-10-CM

## 2022-06-23 PROCEDURE — 3008F BODY MASS INDEX DOCD: CPT | Performed by: ORTHOPAEDIC SURGERY

## 2022-06-23 PROCEDURE — 99214 OFFICE O/P EST MOD 30 MIN: CPT | Performed by: ORTHOPAEDIC SURGERY

## 2022-06-23 NOTE — TELEPHONE ENCOUNTER
Surgeon: Dr. Bella Hurst    Date of Surgery: 7/6       Post Op Appt: 7/14 @ 840    Prior Authorization:   Inpatient or Outpatient: Outpatient  Facility: [facility]  Work Comp: YES  CPT:   DX:     Surgical Assistant: Miki Garner     Preadmission Testing: PER ANESTHESIA GUIDELINES     Pre-Op Clearance Requested: NONE    DME:  YES, GIVEN AT APPT    Rehab Services Ordered: NONE     Disability Paperwork: NO    On [place] Calendar: YES    Notes: RT ELBOW

## 2022-06-23 NOTE — PROGRESS NOTES
OR BOOKING SHEET   Soft Tissue/Degenerative  Name: Kwabena Arzola  MRN: [id number]   : 1965  Diagnosis:  [x] Partial tear of common extensor tendon of right elbow [S56.519A]    Disposition:    [x] Outpatient  [] Overnight for GUTIERREZ  [] Overnight for observation and pain control  [] Inpatient procedure    Operative Time:    [] 45 min     [] 1 hr     [x]  1.5 hr     [] 2 hr     []  2.5 hr      [] 3 hr      Antibiotics:   [x]  Ancef   []  Clindamycin     Laterality:   [x] RIGHT [] LEFT                  [] BILATERAL  [] Thumb     []  Index     [] Middle     [] Ring    []  Small     Surgical Consent:  Right elbow common extensor tendon repair    Procedure Codes:  [] Excision of ganglion cyst (10180)   [] Trigger Finger Release (31427)   [] Excision of tendon sheath cyst (58693)  [] Excision of DIP joint osteophyte (52800)  [] Open Fasciotomy of Dupuytren's Contracture (46932)  []  Excision of foreign body simple (45403)  []  Excision of foreign body complicated (44284)  []  Trapeziectomy (86665)  []  APL Tendon Transfer (59267)  []  1st Dorsal Compartment Release (13377)  []  Tenolysis of Flexor Tendon (Finger OR Palm) (21658)  []  Tenolysis of Flexor Tendon (Finger AND Palm) (35146)  []  Tenolysis of Extensor Tendon (Finger/Hand) (08003)  []  Capsulectomy/capsulotomy for contracture (MCP Joint)  (57326)  []  Capsulectomy/capsulotomy for contracture (IP Joint) (22197)  []  Repair of metacarpal phalangeal joint collateral ligament (77382)  [x]  Lateral epicondylitis debridement with repair (87132)      Anesthesia Type:  [] General  [x] Regional  [] Ludmila Block  [x] Monitored Anesthesia Care  []  Local Only (1% lidocaine, 0.5% marcaine - No Epi - 1:1 mix)  [] Local Only (1% lidocaine WITH epi, 0.5% marcaine - 1:1 mix)    Additional info:   [] PCP Clearance Needed  [] Cardiac Clearance    Equipment:  []  Local (1% lidocaine, 0.5% marcaine - No Epi - 1:1 mix)  []  Local (1% lidocaine WITH epi, 0.5% marcaine - 1:1 mix)  []  Lead Hand  [x]  Upland Blade  [x]  Mini C-arm  []  Full C-arm  []  Arthrex 3.5 mm Swivel Locks   []  3-0 Supramid Suture  [x]  [employer] Jugger Knot 1.45mm    Positioning:   []  Supine with arm table on OR Table  [x]  Supine with arm table on transport cart    Assistant request:   [x]  Yes - Deena Hayward  []  No    Anesthesiologist Request:   None

## 2022-06-27 NOTE — TELEPHONE ENCOUNTER
Surgeon: Dr. Nick Herbert    Date of Surgery: 7/6/22       Post Op Appt: 7/14/22    Prior Authorization:   Inpatient or Outpatient: Outpatient  Facility: [facility]  Work Comp: YES  CPT: 92911  DX: K64.646Y    Surgical Assistant: Sandee Coffman PA-C  Preadmission Testing: PER ANESTHESIA GUIDELINES     Pre-Op Clearance Requested: NONE    DME:  NONE NEEDED    Rehab Services Ordered: NONE     Disability Paperwork: NO    On [place] Calendar: YES    Notes: [employer] Jugger Knot 1.45mm

## 2022-07-01 NOTE — TELEPHONE ENCOUNTER
Received verbal authorization from Anant ELIZABETH nurse regarding OP CPT CODE 33609.  An authorization letter will be sent to my attention- Letter received 7.1.22

## 2022-07-06 ENCOUNTER — HOSPITAL ENCOUNTER (OUTPATIENT)
Facility: HOSPITAL | Age: 57
Setting detail: HOSPITAL OUTPATIENT SURGERY
Discharge: HOME | End: 2022-07-06
Attending: ORTHOPAEDIC SURGERY | Admitting: ORTHOPAEDIC SURGERY
Payer: OTHER MISCELLANEOUS

## 2022-07-06 ENCOUNTER — APPOINTMENT (OUTPATIENT)
Dept: GENERAL RADIOLOGY | Facility: HOSPITAL | Age: 57
End: 2022-07-06
Attending: ORTHOPAEDIC SURGERY
Payer: OTHER MISCELLANEOUS

## 2022-07-06 ENCOUNTER — ANESTHESIA (OUTPATIENT)
Dept: SURGERY | Facility: HOSPITAL | Age: 57
End: 2022-07-06
Payer: OTHER MISCELLANEOUS

## 2022-07-06 ENCOUNTER — ANESTHESIA EVENT (OUTPATIENT)
Dept: SURGERY | Facility: HOSPITAL | Age: 57
End: 2022-07-06
Payer: OTHER MISCELLANEOUS

## 2022-07-06 VITALS
BODY MASS INDEX: 23.04 KG/M2 | OXYGEN SATURATION: 99 % | DIASTOLIC BLOOD PRESSURE: 61 MMHG | RESPIRATION RATE: 12 BRPM | HEIGHT: 68 IN | TEMPERATURE: 97 F | SYSTOLIC BLOOD PRESSURE: 100 MMHG | HEART RATE: 63 BPM | WEIGHT: 152 LBS

## 2022-07-06 DIAGNOSIS — S56.519A PARTIAL TEAR OF COMMON EXTENSOR TENDON OF ELBOW: Primary | ICD-10-CM

## 2022-07-06 LAB — GLUCOSE BLD-MCNC: 124 MG/DL (ref 70–99)

## 2022-07-06 PROCEDURE — 82962 GLUCOSE BLOOD TEST: CPT

## 2022-07-06 PROCEDURE — 0LQ30ZZ REPAIR RIGHT UPPER ARM TENDON, OPEN APPROACH: ICD-10-PCS | Performed by: ORTHOPAEDIC SURGERY

## 2022-07-06 PROCEDURE — 76942 ECHO GUIDE FOR BIOPSY: CPT | Performed by: STUDENT IN AN ORGANIZED HEALTH CARE EDUCATION/TRAINING PROGRAM

## 2022-07-06 PROCEDURE — 0JBD0ZZ EXCISION OF RIGHT UPPER ARM SUBCUTANEOUS TISSUE AND FASCIA, OPEN APPROACH: ICD-10-PCS | Performed by: ORTHOPAEDIC SURGERY

## 2022-07-06 DEVICE — IMPLANTABLE DEVICE
Type: IMPLANTABLE DEVICE | Site: ELBOW | Status: FUNCTIONAL
Brand: JUGGERKNOT SOFT ANCHORS

## 2022-07-06 RX ORDER — SODIUM CHLORIDE 9 MG/ML
INJECTION, SOLUTION INTRAVENOUS CONTINUOUS
Status: DISCONTINUED | OUTPATIENT
Start: 2022-07-06 | End: 2022-07-06

## 2022-07-06 RX ORDER — SODIUM CHLORIDE, SODIUM LACTATE, POTASSIUM CHLORIDE, CALCIUM CHLORIDE 600; 310; 30; 20 MG/100ML; MG/100ML; MG/100ML; MG/100ML
INJECTION, SOLUTION INTRAVENOUS CONTINUOUS
Status: DISCONTINUED | OUTPATIENT
Start: 2022-07-06 | End: 2022-07-06

## 2022-07-06 RX ORDER — HYDROCODONE BITARTRATE AND ACETAMINOPHEN 5; 325 MG/1; MG/1
1 TABLET ORAL ONCE AS NEEDED
Status: COMPLETED | OUTPATIENT
Start: 2022-07-06 | End: 2022-07-06

## 2022-07-06 RX ORDER — HYDROMORPHONE HYDROCHLORIDE 1 MG/ML
0.4 INJECTION, SOLUTION INTRAMUSCULAR; INTRAVENOUS; SUBCUTANEOUS EVERY 5 MIN PRN
Status: ACTIVE | OUTPATIENT
Start: 2022-07-06 | End: 2022-07-06

## 2022-07-06 RX ORDER — HYDROMORPHONE HYDROCHLORIDE 1 MG/ML
0.6 INJECTION, SOLUTION INTRAMUSCULAR; INTRAVENOUS; SUBCUTANEOUS EVERY 5 MIN PRN
Status: ACTIVE | OUTPATIENT
Start: 2022-07-06 | End: 2022-07-06

## 2022-07-06 RX ORDER — BUPRENORPHINE HYDROCHLORIDE 0.32 MG/ML
INJECTION INTRAMUSCULAR; INTRAVENOUS AS NEEDED
Status: DISCONTINUED | OUTPATIENT
Start: 2022-07-06 | End: 2022-07-06 | Stop reason: SURG

## 2022-07-06 RX ORDER — CEFAZOLIN SODIUM/WATER 2 G/20 ML
2 SYRINGE (ML) INTRAVENOUS ONCE
Status: COMPLETED | OUTPATIENT
Start: 2022-07-06 | End: 2022-07-06

## 2022-07-06 RX ORDER — NALOXONE HYDROCHLORIDE 0.4 MG/ML
80 INJECTION, SOLUTION INTRAMUSCULAR; INTRAVENOUS; SUBCUTANEOUS AS NEEDED
Status: ACTIVE | OUTPATIENT
Start: 2022-07-06 | End: 2022-07-06

## 2022-07-06 RX ORDER — HYDROMORPHONE HYDROCHLORIDE 1 MG/ML
0.2 INJECTION, SOLUTION INTRAMUSCULAR; INTRAVENOUS; SUBCUTANEOUS EVERY 5 MIN PRN
Status: ACTIVE | OUTPATIENT
Start: 2022-07-06 | End: 2022-07-06

## 2022-07-06 RX ORDER — ONDANSETRON 2 MG/ML
4 INJECTION INTRAMUSCULAR; INTRAVENOUS EVERY 6 HOURS PRN
Status: DISCONTINUED | OUTPATIENT
Start: 2022-07-06 | End: 2022-07-06

## 2022-07-06 RX ORDER — DEXAMETHASONE SODIUM PHOSPHATE 10 MG/ML
INJECTION, SOLUTION INTRAMUSCULAR; INTRAVENOUS AS NEEDED
Status: DISCONTINUED | OUTPATIENT
Start: 2022-07-06 | End: 2022-07-06 | Stop reason: SURG

## 2022-07-06 RX ORDER — ACETAMINOPHEN 500 MG
1000 TABLET ORAL ONCE
Status: DISCONTINUED | OUTPATIENT
Start: 2022-07-06 | End: 2022-07-06 | Stop reason: HOSPADM

## 2022-07-06 RX ORDER — MIDAZOLAM HYDROCHLORIDE 1 MG/ML
1 INJECTION INTRAMUSCULAR; INTRAVENOUS EVERY 5 MIN PRN
Status: ACTIVE | OUTPATIENT
Start: 2022-07-06 | End: 2022-07-06

## 2022-07-06 RX ORDER — HYDROCODONE BITARTRATE AND ACETAMINOPHEN 5; 325 MG/1; MG/1
2 TABLET ORAL ONCE AS NEEDED
Status: COMPLETED | OUTPATIENT
Start: 2022-07-06 | End: 2022-07-06

## 2022-07-06 RX ORDER — ACETAMINOPHEN 500 MG
1000 TABLET ORAL ONCE AS NEEDED
Status: COMPLETED | OUTPATIENT
Start: 2022-07-06 | End: 2022-07-06

## 2022-07-06 RX ORDER — BUPIVACAINE HYDROCHLORIDE 5 MG/ML
INJECTION, SOLUTION EPIDURAL; INTRACAUDAL AS NEEDED
Status: DISCONTINUED | OUTPATIENT
Start: 2022-07-06 | End: 2022-07-06 | Stop reason: SURG

## 2022-07-06 RX ORDER — MEPERIDINE HYDROCHLORIDE 25 MG/ML
12.5 INJECTION INTRAMUSCULAR; INTRAVENOUS; SUBCUTANEOUS AS NEEDED
Status: DISCONTINUED | OUTPATIENT
Start: 2022-07-06 | End: 2022-07-06

## 2022-07-06 RX ORDER — MIDAZOLAM HYDROCHLORIDE 1 MG/ML
INJECTION INTRAMUSCULAR; INTRAVENOUS AS NEEDED
Status: DISCONTINUED | OUTPATIENT
Start: 2022-07-06 | End: 2022-07-06 | Stop reason: SURG

## 2022-07-06 RX ORDER — PROCHLORPERAZINE EDISYLATE 5 MG/ML
5 INJECTION INTRAMUSCULAR; INTRAVENOUS EVERY 8 HOURS PRN
Status: DISCONTINUED | OUTPATIENT
Start: 2022-07-06 | End: 2022-07-06

## 2022-07-06 RX ADMIN — SODIUM CHLORIDE, SODIUM LACTATE, POTASSIUM CHLORIDE, CALCIUM CHLORIDE: 600; 310; 30; 20 INJECTION, SOLUTION INTRAVENOUS at 09:52:00

## 2022-07-06 RX ADMIN — DEXAMETHASONE SODIUM PHOSPHATE 2 MG: 10 INJECTION, SOLUTION INTRAMUSCULAR; INTRAVENOUS at 08:51:00

## 2022-07-06 RX ADMIN — BUPRENORPHINE HYDROCHLORIDE 150 MCG: 0.32 INJECTION INTRAMUSCULAR; INTRAVENOUS at 08:51:00

## 2022-07-06 RX ADMIN — SODIUM CHLORIDE, SODIUM LACTATE, POTASSIUM CHLORIDE, CALCIUM CHLORIDE: 600; 310; 30; 20 INJECTION, SOLUTION INTRAVENOUS at 08:40:00

## 2022-07-06 RX ADMIN — MIDAZOLAM HYDROCHLORIDE 2 MG: 1 INJECTION INTRAMUSCULAR; INTRAVENOUS at 08:45:00

## 2022-07-06 RX ADMIN — BUPIVACAINE HYDROCHLORIDE 20 ML: 5 INJECTION, SOLUTION EPIDURAL; INTRACAUDAL at 08:51:00

## 2022-07-06 RX ADMIN — CEFAZOLIN SODIUM/WATER 2 G: 2 G/20 ML SYRINGE (ML) INTRAVENOUS at 08:55:00

## 2022-07-06 NOTE — INTERVAL H&P NOTE
Pre-op Diagnosis: Partial tear of common extensor tendon of elbow [S56.519A]    The above referenced H&P was reviewed by JONNA Hinton on 7/6/2022, the patient was examined and no significant changes have occurred in the patient's condition since the H&P was performed. I discussed with the patient and/or legal representative the potential benefits, risks and side effects of this procedure; the likelihood of the patient achieving goals; and potential problems that might occur during recuperation. I discussed reasonable alternatives to the procedure, including risks, benefits and side effects related to the alternatives and risks related to not receiving this procedure. We will proceed with procedure as planned.

## 2022-07-06 NOTE — ANESTHESIA PROCEDURE NOTES
Regional Block  Performed by: Mikayla Stratton MD  Authorized by: Mikayla Stratton MD       General Information and Staff    Start Time:  7/6/2022 8:48 AM  End Time:  7/6/2022 8:52 AM  Anesthesiologist:  Mikayla Stratton MD  Performed by: Anesthesiologist  Patient Location:  OR    Block Placement: Pre Induction  Site Identification: real time ultrasound guided and image stored and retrievable    Block site/laterality marked before start: site marked  Reason for Block: at surgeon's request and post-op pain management    Preanesthetic Checklist: 2 patient identifers, IV checked, site marked, risks and benefits discussed, monitors and equipment checked, pre-op evaluation, timeout performed, anesthesia consent, sterile technique used, no prohibitive neurological deficits and no local skin infection at insertion site      Procedure Details    Patient Position:  Supine  Prep: ChloraPrep    Monitoring:  Cardiac monitor, continuous pulse ox and blood pressure cuff  Block Type:  Supraclavicular  Laterality:  Right  Injection Technique:  Single-shot    Needle    Needle Type:  Short-bevel and echogenic  Needle Gauge:  21 G  Needle Length:  110 mm  Needle Localization:  Ultrasound guidance  Reason for Ultrasound Use: appropriate spread of the medication was noted in real time and no ultrasound evidence of intravascular and/or intraneural injection            Assessment    Injection Assessment:  Good spread noted, negative resistance, negative aspiration for heme, incremental injection, low pressure, local visualized surrounding nerve on ultrasound and no pain on injection  Heart Rate Change: No    - Patient tolerated block procedure well without evidence of immediate block related complications.      Medications      Additional Comments    Medication:  Bupivacaine 0.5% 20mL with decadron PF 2mg, buprenorphine 150mcg

## 2022-07-06 NOTE — BRIEF OP NOTE
Pre-Operative Diagnosis: Partial tear of common extensor tendon of elbow [S56.519A]     Post-Operative Diagnosis: Partial tear of common extensor tendon of elbow [S56.519A]      Procedure Performed:   Right elbow common extensor tendon repair    Surgeon(s) and Role:     Rafael Mirza MD - Primary    Assistant(s):  PA: JONNA Hanson     Surgical Findings: tendinotic tissue     Specimen: none     Estimated Blood Loss: Blood Output: 1 mL (7/6/2022  9:53 AM)      Dictation Number:  none    Shruti Adan MD  7/6/2022  10:03 AM

## 2022-07-06 NOTE — OPERATIVE REPORT
to 250 mmHg. A 15 blade is used to make incision through the dermis over the lateral aspect of the elbow centered over the lateral epicondyle. The incision was carried to the dermis and through the subcutaneous tissue. Bipolar cautery was used to cauterize any crossing vessels. The lateral epicondyle was identified and a 15 blade was used to incise the common extensor tendon at the Levine, Susan. \[facility] and [facility].\"" interval.  The ECRB tendon at the level of the lateral epicondyle was noted to be tendonitic. This portion of the tendon was debrided to the level of the lateral epicondyle. The common extensor tendon tear was identified as well. Rongeur was used to scrape the lateral epicondyle. The guidewire for the Juggerknot suture anchor was used to drill holes to allow egress of blood to facilitate healing postoperatively. The guidewire was placed at the center of the lateral epicondyle. A 1.45 mm suture anchor was then seated with good interference. Joint wound was irrigated prior to closure. The #2 suture was then used to repair the common extensor tear. A watertight closure was noted. The tourniquet was let down and hemostasis achieved with bipolar cautery. Closure:  3-0 Monocryl was used to reapproximate the skin edges. A 4-0 Monocryl was used in a subcuticular fashion. Skin glue and Steri-Strips were applied    Dressing/Splint:  4 x 4 gauze and Kerlix was applied. A long-arm postmold splint was held in place with a gently wrapped Ace wrap. Post Operative:  Patient was woken up from anesthesia and taken to PACU for further recovery and discharge home. Yoel Alonso MD  Hand, Wrist, & Elbow Surgery  [facility]  [facility], [address] [facility], [address] [place] [address]  [email or web address]  t: [id number]  f: [phone]

## 2022-07-14 ENCOUNTER — TELEPHONE (OUTPATIENT)
Dept: ORTHOPEDICS CLINIC | Facility: CLINIC | Age: 57
End: 2022-07-14

## 2022-07-14 ENCOUNTER — OFFICE VISIT (OUTPATIENT)
Dept: ORTHOPEDICS CLINIC | Facility: CLINIC | Age: 57
End: 2022-07-14
Payer: COMMERCIAL

## 2022-07-14 VITALS — OXYGEN SATURATION: 98 % | HEART RATE: 66 BPM

## 2022-07-14 DIAGNOSIS — M77.11 LATERAL EPICONDYLITIS OF RIGHT ELBOW: Primary | ICD-10-CM

## 2022-07-14 PROCEDURE — 99024 POSTOP FOLLOW-UP VISIT: CPT | Performed by: PHYSICIAN ASSISTANT

## 2022-07-18 ENCOUNTER — TELEPHONE (OUTPATIENT)
Dept: PHYSICAL THERAPY | Facility: HOSPITAL | Age: 57
End: 2022-07-18

## 2022-07-18 ENCOUNTER — TELEPHONE (OUTPATIENT)
Dept: PHYSICAL THERAPY | Age: 57
End: 2022-07-18

## 2022-07-22 ENCOUNTER — OFFICE VISIT (OUTPATIENT)
Dept: OCCUPATIONAL MEDICINE | Age: 57
End: 2022-07-22
Attending: INTERNAL MEDICINE
Payer: OTHER MISCELLANEOUS

## 2022-07-22 PROCEDURE — 97165 OT EVAL LOW COMPLEX 30 MIN: CPT

## 2022-07-22 PROCEDURE — 97110 THERAPEUTIC EXERCISES: CPT

## 2022-07-25 ENCOUNTER — OFFICE VISIT (OUTPATIENT)
Dept: OCCUPATIONAL MEDICINE | Age: 57
End: 2022-07-25
Attending: INTERNAL MEDICINE
Payer: OTHER MISCELLANEOUS

## 2022-07-25 PROCEDURE — 97140 MANUAL THERAPY 1/> REGIONS: CPT

## 2022-07-25 PROCEDURE — 97110 THERAPEUTIC EXERCISES: CPT

## 2022-07-29 ENCOUNTER — OFFICE VISIT (OUTPATIENT)
Dept: OCCUPATIONAL MEDICINE | Age: 57
End: 2022-07-29
Attending: INTERNAL MEDICINE
Payer: OTHER MISCELLANEOUS

## 2022-07-29 PROCEDURE — 97140 MANUAL THERAPY 1/> REGIONS: CPT

## 2022-07-29 PROCEDURE — 97110 THERAPEUTIC EXERCISES: CPT

## 2022-08-01 ENCOUNTER — OFFICE VISIT (OUTPATIENT)
Dept: OCCUPATIONAL MEDICINE | Age: 57
End: 2022-08-01
Attending: INTERNAL MEDICINE
Payer: OTHER MISCELLANEOUS

## 2022-08-01 ENCOUNTER — TELEPHONE (OUTPATIENT)
Dept: PHYSICAL THERAPY | Facility: HOSPITAL | Age: 57
End: 2022-08-01

## 2022-08-01 PROCEDURE — 97140 MANUAL THERAPY 1/> REGIONS: CPT

## 2022-08-01 PROCEDURE — 97110 THERAPEUTIC EXERCISES: CPT

## 2022-08-01 NOTE — TELEPHONE ENCOUNTER
----- Message from Kendall Patiño OT sent at 7/29/2022  1:13 PM CDT -----  Hi, I used 3 of the 4 visits work comp approved. Can I request 8 additional sessions?

## 2022-08-02 ENCOUNTER — APPOINTMENT (OUTPATIENT)
Dept: OCCUPATIONAL MEDICINE | Age: 57
End: 2022-08-02
Attending: INTERNAL MEDICINE
Payer: OTHER MISCELLANEOUS

## 2022-08-04 ENCOUNTER — TELEPHONE (OUTPATIENT)
Dept: PHYSICAL THERAPY | Facility: HOSPITAL | Age: 57
End: 2022-08-04

## 2022-08-04 ENCOUNTER — OFFICE VISIT (OUTPATIENT)
Dept: ORTHOPEDICS CLINIC | Facility: CLINIC | Age: 57
End: 2022-08-04
Payer: OTHER MISCELLANEOUS

## 2022-08-04 VITALS — BODY MASS INDEX: 23.49 KG/M2 | WEIGHT: 155 LBS | HEIGHT: 68 IN

## 2022-08-04 DIAGNOSIS — M77.11 LATERAL EPICONDYLITIS OF RIGHT ELBOW: Primary | ICD-10-CM

## 2022-08-04 PROCEDURE — 3008F BODY MASS INDEX DOCD: CPT | Performed by: PHYSICIAN ASSISTANT

## 2022-08-04 PROCEDURE — 99024 POSTOP FOLLOW-UP VISIT: CPT | Performed by: PHYSICIAN ASSISTANT

## 2022-08-05 ENCOUNTER — OFFICE VISIT (OUTPATIENT)
Dept: OCCUPATIONAL MEDICINE | Age: 57
End: 2022-08-05
Attending: INTERNAL MEDICINE
Payer: OTHER MISCELLANEOUS

## 2022-08-05 PROCEDURE — 97140 MANUAL THERAPY 1/> REGIONS: CPT

## 2022-08-05 PROCEDURE — 97110 THERAPEUTIC EXERCISES: CPT

## 2022-08-09 ENCOUNTER — OFFICE VISIT (OUTPATIENT)
Dept: OCCUPATIONAL MEDICINE | Age: 57
End: 2022-08-09
Attending: INTERNAL MEDICINE
Payer: OTHER MISCELLANEOUS

## 2022-08-09 PROCEDURE — 97140 MANUAL THERAPY 1/> REGIONS: CPT

## 2022-08-09 PROCEDURE — 97110 THERAPEUTIC EXERCISES: CPT

## 2022-08-12 ENCOUNTER — TELEPHONE (OUTPATIENT)
Dept: PHYSICAL THERAPY | Facility: HOSPITAL | Age: 57
End: 2022-08-12

## 2022-08-12 ENCOUNTER — APPOINTMENT (OUTPATIENT)
Dept: OCCUPATIONAL MEDICINE | Age: 57
End: 2022-08-12
Attending: INTERNAL MEDICINE
Payer: OTHER MISCELLANEOUS

## 2022-08-15 ENCOUNTER — TELEPHONE (OUTPATIENT)
Dept: ORTHOPEDICS CLINIC | Facility: CLINIC | Age: 57
End: 2022-08-15

## 2022-08-15 NOTE — TELEPHONE ENCOUNTER
Request received for 6/14/22, 6/23/22, 7/14/22, and 8/4/22. Jane Singh  Faxed notes to workers comp  at [phone]. Received fax confirmation.

## 2022-08-18 ENCOUNTER — OFFICE VISIT (OUTPATIENT)
Dept: OCCUPATIONAL MEDICINE | Age: 57
End: 2022-08-18
Attending: INTERNAL MEDICINE
Payer: OTHER MISCELLANEOUS

## 2022-08-18 PROCEDURE — 97110 THERAPEUTIC EXERCISES: CPT

## 2022-08-18 PROCEDURE — 97140 MANUAL THERAPY 1/> REGIONS: CPT

## 2022-08-22 ENCOUNTER — APPOINTMENT (OUTPATIENT)
Dept: OCCUPATIONAL MEDICINE | Age: 57
End: 2022-08-22
Attending: INTERNAL MEDICINE
Payer: OTHER MISCELLANEOUS

## 2022-08-23 ENCOUNTER — OFFICE VISIT (OUTPATIENT)
Dept: OCCUPATIONAL MEDICINE | Age: 57
End: 2022-08-23
Attending: INTERNAL MEDICINE
Payer: OTHER MISCELLANEOUS

## 2022-08-23 PROCEDURE — 97110 THERAPEUTIC EXERCISES: CPT

## 2022-08-23 PROCEDURE — 97140 MANUAL THERAPY 1/> REGIONS: CPT

## 2022-08-25 ENCOUNTER — APPOINTMENT (OUTPATIENT)
Dept: OCCUPATIONAL MEDICINE | Age: 57
End: 2022-08-25
Attending: INTERNAL MEDICINE
Payer: OTHER MISCELLANEOUS

## 2022-08-26 ENCOUNTER — OFFICE VISIT (OUTPATIENT)
Dept: OCCUPATIONAL MEDICINE | Age: 57
End: 2022-08-26
Attending: INTERNAL MEDICINE
Payer: OTHER MISCELLANEOUS

## 2022-08-26 PROCEDURE — 97110 THERAPEUTIC EXERCISES: CPT

## 2022-08-26 PROCEDURE — 97140 MANUAL THERAPY 1/> REGIONS: CPT

## 2022-08-30 ENCOUNTER — OFFICE VISIT (OUTPATIENT)
Dept: OCCUPATIONAL MEDICINE | Age: 57
End: 2022-08-30
Attending: INTERNAL MEDICINE
Payer: OTHER MISCELLANEOUS

## 2022-08-30 PROCEDURE — 97110 THERAPEUTIC EXERCISES: CPT

## 2022-08-30 PROCEDURE — 97140 MANUAL THERAPY 1/> REGIONS: CPT

## 2022-09-01 ENCOUNTER — APPOINTMENT (OUTPATIENT)
Dept: OCCUPATIONAL MEDICINE | Age: 57
End: 2022-09-01
Attending: INTERNAL MEDICINE
Payer: OTHER MISCELLANEOUS

## 2022-09-02 ENCOUNTER — OFFICE VISIT (OUTPATIENT)
Dept: OCCUPATIONAL MEDICINE | Age: 57
End: 2022-09-02
Attending: INTERNAL MEDICINE
Payer: OTHER MISCELLANEOUS

## 2022-09-02 PROCEDURE — 97140 MANUAL THERAPY 1/> REGIONS: CPT

## 2022-09-02 PROCEDURE — 97110 THERAPEUTIC EXERCISES: CPT

## 2022-09-08 ENCOUNTER — OFFICE VISIT (OUTPATIENT)
Dept: ORTHOPEDICS CLINIC | Facility: CLINIC | Age: 57
End: 2022-09-08
Payer: OTHER MISCELLANEOUS

## 2022-09-08 ENCOUNTER — APPOINTMENT (OUTPATIENT)
Dept: OCCUPATIONAL MEDICINE | Age: 57
End: 2022-09-08
Attending: INTERNAL MEDICINE
Payer: OTHER MISCELLANEOUS

## 2022-09-08 ENCOUNTER — TELEPHONE (OUTPATIENT)
Dept: ORTHOPEDICS CLINIC | Facility: CLINIC | Age: 57
End: 2022-09-08

## 2022-09-08 VITALS — BODY MASS INDEX: 23.49 KG/M2 | WEIGHT: 155 LBS | HEIGHT: 68 IN

## 2022-09-08 DIAGNOSIS — M77.11 LATERAL EPICONDYLITIS OF RIGHT ELBOW: Primary | ICD-10-CM

## 2022-09-08 PROCEDURE — 99024 POSTOP FOLLOW-UP VISIT: CPT | Performed by: PHYSICIAN ASSISTANT

## 2022-09-08 PROCEDURE — 3008F BODY MASS INDEX DOCD: CPT | Performed by: PHYSICIAN ASSISTANT

## 2022-09-12 ENCOUNTER — APPOINTMENT (OUTPATIENT)
Dept: OCCUPATIONAL MEDICINE | Age: 57
End: 2022-09-12
Attending: INTERNAL MEDICINE
Payer: OTHER MISCELLANEOUS

## 2022-10-27 ENCOUNTER — OFFICE VISIT (OUTPATIENT)
Dept: ORTHOPEDICS CLINIC | Facility: CLINIC | Age: 57
End: 2022-10-27
Payer: OTHER MISCELLANEOUS

## 2022-10-27 VITALS — BODY MASS INDEX: 23.49 KG/M2 | HEIGHT: 68 IN | WEIGHT: 155 LBS

## 2022-10-27 DIAGNOSIS — M77.11 LATERAL EPICONDYLITIS OF RIGHT ELBOW: Primary | ICD-10-CM

## 2022-10-27 PROCEDURE — 99213 OFFICE O/P EST LOW 20 MIN: CPT | Performed by: ORTHOPAEDIC SURGERY

## 2022-10-27 PROCEDURE — 3008F BODY MASS INDEX DOCD: CPT | Performed by: ORTHOPAEDIC SURGERY

## 2022-11-02 ENCOUNTER — TELEPHONE (OUTPATIENT)
Dept: ORTHOPEDICS CLINIC | Facility: CLINIC | Age: 57
End: 2022-11-02

## 2022-11-02 DIAGNOSIS — M25.511 RIGHT SHOULDER PAIN, UNSPECIFIED CHRONICITY: Primary | ICD-10-CM

## 2022-11-02 NOTE — TELEPHONE ENCOUNTER
Patient states she has been trying to contact [address] [place] [address] as Dr. Nick Herbert recommended but she cannot get ahold of anyone. Patient requesting to know if it is okay for her to find someone on her own.

## 2022-11-02 NOTE — TELEPHONE ENCOUNTER
Spoke with patient who stated that she has called [facility] several times, but is getting the run-around in regards to scheduling. Pt wanted to know if there was another provider that Dr. Aden Adam recommended, or if she is able to find a different provider on her own. Please advise.

## 2022-11-02 NOTE — TELEPHONE ENCOUNTER
NPT scheduled with Dr. Valentino Holmes on 11/7/22 for rt shoulder pain. Patient does not have any imaging in epic. Future Appointments   Date Time Provider Edilberto Milesi   11/7/2022  9:20 AM Marquise Gomez MD [id number]   1/5/2023  2:40 PM Geovani Simental MD [facility]     Please advise if patient will need imaging prior to appt.  Thank you

## 2022-11-07 ENCOUNTER — OFFICE VISIT (OUTPATIENT)
Dept: ORTHOPEDICS CLINIC | Facility: CLINIC | Age: 57
End: 2022-11-07
Payer: OTHER MISCELLANEOUS

## 2022-11-07 ENCOUNTER — HOSPITAL ENCOUNTER (OUTPATIENT)
Dept: GENERAL RADIOLOGY | Age: 57
Discharge: HOME | End: 2022-11-07
Attending: ORTHOPAEDIC SURGERY
Payer: OTHER MISCELLANEOUS

## 2022-11-07 VITALS — BODY MASS INDEX: 23.49 KG/M2 | WEIGHT: 155 LBS | HEIGHT: 68 IN

## 2022-11-07 DIAGNOSIS — M25.511 RIGHT SHOULDER PAIN, UNSPECIFIED CHRONICITY: Primary | ICD-10-CM

## 2022-11-07 DIAGNOSIS — M25.511 RIGHT SHOULDER PAIN, UNSPECIFIED CHRONICITY: ICD-10-CM

## 2022-11-07 DIAGNOSIS — S46.001A INJURY OF RIGHT ROTATOR CUFF, INITIAL ENCOUNTER: ICD-10-CM

## 2022-11-07 PROCEDURE — 3008F BODY MASS INDEX DOCD: CPT | Performed by: ORTHOPAEDIC SURGERY

## 2022-11-07 PROCEDURE — 99204 OFFICE O/P NEW MOD 45 MIN: CPT | Performed by: ORTHOPAEDIC SURGERY

## 2022-11-07 PROCEDURE — 73030 X-RAY EXAM OF SHOULDER: CPT | Performed by: ORTHOPAEDIC SURGERY

## 2022-11-10 ENCOUNTER — HOSPITAL ENCOUNTER (OUTPATIENT)
Dept: MRI IMAGING | Age: 57
Discharge: HOME | End: 2022-11-10
Attending: ORTHOPAEDIC SURGERY
Payer: OTHER MISCELLANEOUS

## 2022-11-10 DIAGNOSIS — S46.001A INJURY OF RIGHT ROTATOR CUFF, INITIAL ENCOUNTER: ICD-10-CM

## 2022-11-10 DIAGNOSIS — M25.511 RIGHT SHOULDER PAIN, UNSPECIFIED CHRONICITY: ICD-10-CM

## 2022-11-10 PROCEDURE — 73221 MRI JOINT UPR EXTREM W/O DYE: CPT | Performed by: ORTHOPAEDIC SURGERY

## 2022-11-14 ENCOUNTER — TELEPHONE (OUTPATIENT)
Dept: ORTHOPEDICS CLINIC | Facility: CLINIC | Age: 57
End: 2022-11-14

## 2022-11-14 ENCOUNTER — PATIENT MESSAGE (OUTPATIENT)
Dept: ORTHOPEDICS CLINIC | Facility: CLINIC | Age: 57
End: 2022-11-14

## 2022-11-14 ENCOUNTER — OFFICE VISIT (OUTPATIENT)
Dept: ORTHOPEDICS CLINIC | Facility: CLINIC | Age: 57
End: 2022-11-14
Payer: OTHER MISCELLANEOUS

## 2022-11-14 DIAGNOSIS — M75.01 ADHESIVE CAPSULITIS OF RIGHT SHOULDER: Primary | ICD-10-CM

## 2022-11-14 DIAGNOSIS — S43.431A SUPERIOR GLENOID LABRUM LESION OF RIGHT SHOULDER, INITIAL ENCOUNTER: ICD-10-CM

## 2022-11-14 PROCEDURE — 99214 OFFICE O/P EST MOD 30 MIN: CPT | Performed by: ORTHOPAEDIC SURGERY

## 2022-11-14 RX ORDER — METHYLPREDNISOLONE 4 MG/1
TABLET ORAL
Qty: 1 EACH | Refills: 0 | Status: SHIPPED | OUTPATIENT
Start: 2022-11-14

## 2022-11-14 RX ORDER — MELOXICAM 15 MG/1
15 TABLET ORAL DAILY
Qty: 14 TABLET | Refills: 1 | Status: SHIPPED | OUTPATIENT
Start: 2022-11-14

## 2022-11-14 NOTE — TELEPHONE ENCOUNTER
Bree Shetty MD  You 3 minutes ago (4:09 PM)     She may return to work without use of the right upper extremity pending physical therapy and repeat clinical evaluation in 8 weeks. Work Letter sent to patient. Pt aware this is sent through [employer].

## 2022-11-14 NOTE — TELEPHONE ENCOUNTER
Patient was seen today by Dr. Thuy Lemon and wants to know if she has any work restrictions? No notes in chart.  Please advise

## 2022-11-15 ENCOUNTER — TELEPHONE (OUTPATIENT)
Dept: ORTHOPEDICS CLINIC | Facility: CLINIC | Age: 57
End: 2022-11-15

## 2022-11-16 NOTE — TELEPHONE ENCOUNTER
From: Silvana Mackenzie  To: Jonel Zambrnao MD  Sent: 11/14/2022 6:01 PM CST  Subject: Question regarding MRI SHOULDER, RIGHT (WITHOUT CONTRAST) (CPT=73221)    I was wondering if you can lift the work restriction. 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 Thank you.   Karen Abarca

## 2022-11-26 ENCOUNTER — HOSPITAL ENCOUNTER (EMERGENCY)
Facility: HOSPITAL | Age: 57
Discharge: HOME | End: 2022-11-27
Attending: EMERGENCY MEDICINE
Payer: COMMERCIAL

## 2022-11-26 ENCOUNTER — APPOINTMENT (OUTPATIENT)
Dept: GENERAL RADIOLOGY | Facility: HOSPITAL | Age: 57
End: 2022-11-26
Payer: COMMERCIAL

## 2022-11-26 DIAGNOSIS — B34.9 VIRAL SYNDROME: Primary | ICD-10-CM

## 2022-11-26 LAB
FLUAV + FLUBV RNA SPEC NAA+PROBE: NEGATIVE
FLUAV + FLUBV RNA SPEC NAA+PROBE: NEGATIVE
RSV RNA SPEC NAA+PROBE: NEGATIVE
SARS-COV-2 RNA RESP QL NAA+PROBE: NOT DETECTED

## 2022-11-26 PROCEDURE — 93010 ELECTROCARDIOGRAM REPORT: CPT | Performed by: EMERGENCY MEDICINE

## 2022-11-26 PROCEDURE — 0241U SARS-COV-2/FLU A AND B/RSV BY PCR (GENEXPERT): CPT

## 2022-11-26 PROCEDURE — 93005 ELECTROCARDIOGRAM TRACING: CPT

## 2022-11-26 PROCEDURE — 99284 EMERGENCY DEPT VISIT MOD MDM: CPT | Performed by: EMERGENCY MEDICINE

## 2022-11-26 PROCEDURE — 71046 X-RAY EXAM CHEST 2 VIEWS: CPT

## 2022-11-26 PROCEDURE — 94640 AIRWAY INHALATION TREATMENT: CPT

## 2022-11-26 PROCEDURE — 0241U SARS-COV-2/FLU A AND B/RSV BY PCR (GENEXPERT): CPT | Performed by: EMERGENCY MEDICINE

## 2022-11-26 RX ORDER — ALBUTEROL SULFATE 90 UG/1
2 AEROSOL, METERED RESPIRATORY (INHALATION) EVERY 4 HOURS PRN
Qty: 1 EACH | Refills: 0 | Status: SHIPPED | OUTPATIENT
Start: 2022-11-26 | End: 2022-12-26

## 2022-11-26 RX ORDER — CODEINE PHOSPHATE AND GUAIFENESIN 10; 100 MG/5ML; MG/5ML
10 SOLUTION ORAL EVERY 6 HOURS PRN
Qty: 200 ML | Refills: 0 | Status: SHIPPED | OUTPATIENT
Start: 2022-11-26

## 2022-11-26 RX ORDER — IPRATROPIUM BROMIDE AND ALBUTEROL SULFATE 2.5; .5 MG/3ML; MG/3ML
3 SOLUTION RESPIRATORY (INHALATION) ONCE
Status: COMPLETED | OUTPATIENT
Start: 2022-11-26 | End: 2022-11-26

## 2022-11-26 RX ORDER — PREDNISONE 50 MG/1
50 TABLET ORAL DAILY
Qty: 5 TABLET | Refills: 0 | Status: SHIPPED | OUTPATIENT
Start: 2022-11-26

## 2022-11-26 RX ORDER — ACETAMINOPHEN 500 MG
1000 TABLET ORAL ONCE
Status: COMPLETED | OUTPATIENT
Start: 2022-11-26 | End: 2022-11-26

## 2022-11-26 RX ORDER — ALBUTEROL SULFATE 1.25 MG/3ML
1 SOLUTION RESPIRATORY (INHALATION) EVERY 6 HOURS PRN
Qty: 75 ML | Refills: 0 | Status: SHIPPED | OUTPATIENT
Start: 2022-11-26

## 2022-11-27 VITALS
TEMPERATURE: 98 F | HEART RATE: 61 BPM | BODY MASS INDEX: 23.49 KG/M2 | DIASTOLIC BLOOD PRESSURE: 64 MMHG | RESPIRATION RATE: 22 BRPM | OXYGEN SATURATION: 98 % | SYSTOLIC BLOOD PRESSURE: 107 MMHG | WEIGHT: 155 LBS | HEIGHT: 68 IN

## 2022-11-27 LAB
ATRIAL RATE: 63 BPM
P AXIS: 68 DEGREES
P-R INTERVAL: 166 MS
Q-T INTERVAL: 440 MS
QRS DURATION: 72 MS
QTC CALCULATION (BEZET): 450 MS
R AXIS: 75 DEGREES
T AXIS: 70 DEGREES
VENTRICULAR RATE: 63 BPM

## 2022-11-27 NOTE — ED INITIAL ASSESSMENT (HPI)
Cough and fever with shortness of breath for one week. Nausea and diarrhea +. Taking ibuprofen, nyquil, and mucinex at home but cough has continued to get worse. Sats 99% on RA.

## 2022-11-27 NOTE — ED INITIAL ASSESSMENT (HPI)
Pt here for cough and SOB  Per pt, \"I have been sick since last Saturday with cough, fever, runny nose and chills. Today, I feel like I can't catch my breath. \"

## 2023-01-18 ENCOUNTER — HOSPITAL ENCOUNTER (OUTPATIENT)
Facility: HOSPITAL | Age: 58
Setting detail: OBSERVATION
Discharge: HOME | End: 2023-01-19
Attending: EMERGENCY MEDICINE | Admitting: INTERNAL MEDICINE
Payer: COMMERCIAL

## 2023-01-18 ENCOUNTER — APPOINTMENT (OUTPATIENT)
Dept: CT IMAGING | Facility: HOSPITAL | Age: 58
End: 2023-01-18
Attending: EMERGENCY MEDICINE
Payer: COMMERCIAL

## 2023-01-18 ENCOUNTER — HOSPITAL ENCOUNTER (OUTPATIENT)
Age: 58
Discharge: ANOTHER HOSPITAL | End: 2023-01-18
Attending: EMERGENCY MEDICINE
Payer: COMMERCIAL

## 2023-01-18 VITALS
TEMPERATURE: 98 F | OXYGEN SATURATION: 99 % | HEART RATE: 92 BPM | BODY MASS INDEX: 23.79 KG/M2 | HEIGHT: 68 IN | DIASTOLIC BLOOD PRESSURE: 68 MMHG | WEIGHT: 157 LBS | SYSTOLIC BLOOD PRESSURE: 139 MMHG | RESPIRATION RATE: 16 BRPM

## 2023-01-18 DIAGNOSIS — R47.1 DYSARTHRIA: Primary | ICD-10-CM

## 2023-01-18 DIAGNOSIS — R29.898 WEAKNESS OF RIGHT LOWER EXTREMITY: ICD-10-CM

## 2023-01-18 DIAGNOSIS — R29.898 RIGHT ARM WEAKNESS: ICD-10-CM

## 2023-01-18 LAB
ALBUMIN SERPL-MCNC: 3.9 G/DL (ref 3.4–5)
ALBUMIN/GLOB SERPL: 1.3 {RATIO} (ref 1–2)
ALP LIVER SERPL-CCNC: 98 U/L
ALT SERPL-CCNC: 16 U/L
ANION GAP SERPL CALC-SCNC: 4 MMOL/L (ref 0–18)
APTT PPP: 27.6 SECONDS (ref 23.3–35.6)
AST SERPL-CCNC: 11 U/L (ref 15–37)
ATRIAL RATE: 61 BPM
BASOPHILS # BLD AUTO: 0.06 X10(3) UL (ref 0–0.2)
BASOPHILS NFR BLD AUTO: 0.8 %
BILIRUB SERPL-MCNC: 0.3 MG/DL (ref 0.1–2)
BUN BLD-MCNC: 15 MG/DL (ref 7–18)
CALCIUM BLD-MCNC: 9 MG/DL (ref 8.5–10.1)
CHLORIDE SERPL-SCNC: 109 MMOL/L (ref 98–112)
CHOLEST SERPL-MCNC: 238 MG/DL (ref ?–200)
CO2 SERPL-SCNC: 27 MMOL/L (ref 21–32)
CREAT BLD-MCNC: 0.87 MG/DL
EOSINOPHIL # BLD AUTO: 0.32 X10(3) UL (ref 0–0.7)
EOSINOPHIL NFR BLD AUTO: 4.4 %
ERYTHROCYTE [DISTWIDTH] IN BLOOD BY AUTOMATED COUNT: 12.1 %
GFR SERPLBLD BASED ON 1.73 SQ M-ARVRAT: 78 ML/MIN/1.73M2 (ref 60–?)
GLOBULIN PLAS-MCNC: 3 G/DL (ref 2.8–4.4)
GLUCOSE BLD-MCNC: 100 MG/DL (ref 70–99)
HCT VFR BLD AUTO: 36.5 %
HDLC SERPL-MCNC: 73 MG/DL (ref 40–59)
HGB BLD-MCNC: 12.5 G/DL
IMM GRANULOCYTES # BLD AUTO: 0.01 X10(3) UL (ref 0–1)
IMM GRANULOCYTES NFR BLD: 0.1 %
INR BLD: 0.85 (ref 0.85–1.16)
LDLC SERPL CALC-MCNC: 143 MG/DL (ref ?–100)
LYMPHOCYTES # BLD AUTO: 2.96 X10(3) UL (ref 1–4)
LYMPHOCYTES NFR BLD AUTO: 40.6 %
MCH RBC QN AUTO: 30.8 PG (ref 26–34)
MCHC RBC AUTO-ENTMCNC: 34.2 G/DL (ref 31–37)
MCV RBC AUTO: 89.9 FL
MONOCYTES # BLD AUTO: 0.64 X10(3) UL (ref 0.1–1)
MONOCYTES NFR BLD AUTO: 8.8 %
NEUTROPHILS # BLD AUTO: 3.3 X10 (3) UL (ref 1.5–7.7)
NEUTROPHILS # BLD AUTO: 3.3 X10(3) UL (ref 1.5–7.7)
NEUTROPHILS NFR BLD AUTO: 45.3 %
NONHDLC SERPL-MCNC: 165 MG/DL (ref ?–130)
OSMOLALITY SERPL CALC.SUM OF ELEC: 291 MOSM/KG (ref 275–295)
P AXIS: 64 DEGREES
P-R INTERVAL: 178 MS
PLATELET # BLD AUTO: 237 10(3)UL (ref 150–450)
POTASSIUM SERPL-SCNC: 4 MMOL/L (ref 3.5–5.1)
PROT SERPL-MCNC: 6.9 G/DL (ref 6.4–8.2)
PROTHROMBIN TIME: 11.6 SECONDS (ref 11.6–14.8)
Q-T INTERVAL: 422 MS
QRS DURATION: 58 MS
QTC CALCULATION (BEZET): 424 MS
R AXIS: 68 DEGREES
RBC # BLD AUTO: 4.06 X10(6)UL
SARS-COV-2 RNA RESP QL NAA+PROBE: NOT DETECTED
SODIUM SERPL-SCNC: 140 MMOL/L (ref 136–145)
T AXIS: 68 DEGREES
TRIGL SERPL-MCNC: 128 MG/DL (ref 30–149)
TROPONIN I HIGH SENSITIVITY: 4 NG/L
VENTRICULAR RATE: 61 BPM
VLDLC SERPL CALC-MCNC: 24 MG/DL (ref 0–30)
WBC # BLD AUTO: 7.3 X10(3) UL (ref 4–11)

## 2023-01-18 PROCEDURE — 99213 OFFICE O/P EST LOW 20 MIN: CPT

## 2023-01-18 PROCEDURE — 70496 CT ANGIOGRAPHY HEAD: CPT | Performed by: EMERGENCY MEDICINE

## 2023-01-18 PROCEDURE — 70498 CT ANGIOGRAPHY NECK: CPT | Performed by: EMERGENCY MEDICINE

## 2023-01-18 PROCEDURE — 99223 1ST HOSP IP/OBS HIGH 75: CPT | Performed by: HOSPITALIST

## 2023-01-18 RX ORDER — ACETAMINOPHEN 650 MG/1
650 SUPPOSITORY RECTAL EVERY 4 HOURS PRN
Status: DISCONTINUED | OUTPATIENT
Start: 2023-01-18 | End: 2023-01-19

## 2023-01-18 RX ORDER — PROCHLORPERAZINE EDISYLATE 5 MG/ML
5 INJECTION INTRAMUSCULAR; INTRAVENOUS EVERY 8 HOURS PRN
Status: DISCONTINUED | OUTPATIENT
Start: 2023-01-18 | End: 2023-01-19

## 2023-01-18 RX ORDER — ASPIRIN 300 MG/1
300 SUPPOSITORY RECTAL DAILY
Status: DISCONTINUED | OUTPATIENT
Start: 2023-01-18 | End: 2023-01-19

## 2023-01-18 RX ORDER — ATORVASTATIN CALCIUM 40 MG/1
40 TABLET, FILM COATED ORAL NIGHTLY
Status: DISCONTINUED | OUTPATIENT
Start: 2023-01-18 | End: 2023-01-19

## 2023-01-18 RX ORDER — ACETAMINOPHEN 500 MG
1000 TABLET ORAL ONCE
Status: COMPLETED | OUTPATIENT
Start: 2023-01-18 | End: 2023-01-18

## 2023-01-18 RX ORDER — HYDRALAZINE HYDROCHLORIDE 20 MG/ML
10 INJECTION INTRAMUSCULAR; INTRAVENOUS EVERY 2 HOUR PRN
Status: DISCONTINUED | OUTPATIENT
Start: 2023-01-18 | End: 2023-01-19

## 2023-01-18 RX ORDER — LABETALOL HYDROCHLORIDE 5 MG/ML
10 INJECTION, SOLUTION INTRAVENOUS EVERY 10 MIN PRN
Status: DISCONTINUED | OUTPATIENT
Start: 2023-01-18 | End: 2023-01-19

## 2023-01-18 RX ORDER — ONDANSETRON 2 MG/ML
4 INJECTION INTRAMUSCULAR; INTRAVENOUS EVERY 6 HOURS PRN
Status: DISCONTINUED | OUTPATIENT
Start: 2023-01-18 | End: 2023-01-19

## 2023-01-18 RX ORDER — ASPIRIN 325 MG
325 TABLET ORAL DAILY
Status: DISCONTINUED | OUTPATIENT
Start: 2023-01-18 | End: 2023-01-19

## 2023-01-18 RX ORDER — ENOXAPARIN SODIUM 100 MG/ML
40 INJECTION SUBCUTANEOUS DAILY
Status: DISCONTINUED | OUTPATIENT
Start: 2023-01-19 | End: 2023-01-19

## 2023-01-18 RX ORDER — ACETAMINOPHEN 325 MG/1
650 TABLET ORAL EVERY 4 HOURS PRN
Status: DISCONTINUED | OUTPATIENT
Start: 2023-01-18 | End: 2023-01-19

## 2023-01-18 RX ORDER — SODIUM CHLORIDE 9 MG/ML
INJECTION, SOLUTION INTRAVENOUS CONTINUOUS
Status: DISCONTINUED | OUTPATIENT
Start: 2023-01-18 | End: 2023-01-19

## 2023-01-18 NOTE — ED INITIAL ASSESSMENT (HPI)
Since yesterday, c/o tongue feeling swollen, and has right posterior head pain/pressure that radiates along the right jaw. States she has been drooling and has weakness in the right leg. History of TMJ.

## 2023-01-18 NOTE — ED INITIAL ASSESSMENT (HPI)
Pt to ED with complaints of tongue swelling since yesterday. Pt denies any airway issues since yesterday. Having some drooling but able to swallow.

## 2023-01-19 ENCOUNTER — APPOINTMENT (OUTPATIENT)
Dept: MRI IMAGING | Facility: HOSPITAL | Age: 58
End: 2023-01-19
Attending: HOSPITALIST
Payer: COMMERCIAL

## 2023-01-19 ENCOUNTER — APPOINTMENT (OUTPATIENT)
Dept: CV DIAGNOSTICS | Facility: HOSPITAL | Age: 58
End: 2023-01-19
Attending: HOSPITALIST
Payer: COMMERCIAL

## 2023-01-19 VITALS
HEIGHT: 65 IN | TEMPERATURE: 98 F | OXYGEN SATURATION: 97 % | BODY MASS INDEX: 25.99 KG/M2 | SYSTOLIC BLOOD PRESSURE: 106 MMHG | HEART RATE: 51 BPM | WEIGHT: 156 LBS | DIASTOLIC BLOOD PRESSURE: 65 MMHG | RESPIRATION RATE: 18 BRPM

## 2023-01-19 LAB
ANION GAP SERPL CALC-SCNC: 4 MMOL/L (ref 0–18)
BASOPHILS # BLD AUTO: 0.06 X10(3) UL (ref 0–0.2)
BASOPHILS NFR BLD AUTO: 0.9 %
BUN BLD-MCNC: 14 MG/DL (ref 7–18)
CALCIUM BLD-MCNC: 8.6 MG/DL (ref 8.5–10.1)
CHLORIDE SERPL-SCNC: 112 MMOL/L (ref 98–112)
CO2 SERPL-SCNC: 25 MMOL/L (ref 21–32)
CREAT BLD-MCNC: 0.79 MG/DL
EOSINOPHIL # BLD AUTO: 0.38 X10(3) UL (ref 0–0.7)
EOSINOPHIL NFR BLD AUTO: 5.5 %
ERYTHROCYTE [DISTWIDTH] IN BLOOD BY AUTOMATED COUNT: 12.1 %
EST. AVERAGE GLUCOSE BLD GHB EST-MCNC: 108 MG/DL (ref 68–126)
GFR SERPLBLD BASED ON 1.73 SQ M-ARVRAT: 88 ML/MIN/1.73M2 (ref 60–?)
GLUCOSE BLD-MCNC: 93 MG/DL (ref 70–99)
HBA1C MFR BLD: 5.4 % (ref ?–5.7)
HCT VFR BLD AUTO: 36.9 %
HGB BLD-MCNC: 12.3 G/DL
IMM GRANULOCYTES # BLD AUTO: 0.02 X10(3) UL (ref 0–1)
IMM GRANULOCYTES NFR BLD: 0.3 %
LYMPHOCYTES # BLD AUTO: 2.61 X10(3) UL (ref 1–4)
LYMPHOCYTES NFR BLD AUTO: 37.9 %
MCH RBC QN AUTO: 30.8 PG (ref 26–34)
MCHC RBC AUTO-ENTMCNC: 33.3 G/DL (ref 31–37)
MCV RBC AUTO: 92.3 FL
MONOCYTES # BLD AUTO: 0.6 X10(3) UL (ref 0.1–1)
MONOCYTES NFR BLD AUTO: 8.7 %
NEUTROPHILS # BLD AUTO: 3.21 X10 (3) UL (ref 1.5–7.7)
NEUTROPHILS # BLD AUTO: 3.21 X10(3) UL (ref 1.5–7.7)
NEUTROPHILS NFR BLD AUTO: 46.7 %
OSMOLALITY SERPL CALC.SUM OF ELEC: 292 MOSM/KG (ref 275–295)
PLATELET # BLD AUTO: 226 10(3)UL (ref 150–450)
POTASSIUM SERPL-SCNC: 4.2 MMOL/L (ref 3.5–5.1)
RBC # BLD AUTO: 4 X10(6)UL
SODIUM SERPL-SCNC: 141 MMOL/L (ref 136–145)
WBC # BLD AUTO: 6.9 X10(3) UL (ref 4–11)

## 2023-01-19 PROCEDURE — 70544 MR ANGIOGRAPHY HEAD W/O DYE: CPT | Performed by: HOSPITALIST

## 2023-01-19 PROCEDURE — 99232 SBSQ HOSP IP/OBS MODERATE 35: CPT | Performed by: HOSPITALIST

## 2023-01-19 PROCEDURE — 99245 OFF/OP CONSLTJ NEW/EST HI 55: CPT | Performed by: OTHER

## 2023-01-19 PROCEDURE — 70547 MR ANGIOGRAPHY NECK W/O DYE: CPT | Performed by: HOSPITALIST

## 2023-01-19 PROCEDURE — 93306 TTE W/DOPPLER COMPLETE: CPT | Performed by: HOSPITALIST

## 2023-01-19 PROCEDURE — 70551 MRI BRAIN STEM W/O DYE: CPT | Performed by: HOSPITALIST

## 2023-01-19 RX ORDER — ASPIRIN 325 MG
325 TABLET ORAL DAILY
Qty: 1 TABLET | Refills: 0 | Status: SHIPPED | OUTPATIENT
Start: 2023-01-20 | End: 2023-01-19

## 2023-01-19 NOTE — PLAN OF CARE
NURSING ADMISSION NOTE      Patient admitted via Cart to 2815  Oriented to room. Safety precautions initiated. Bed in low position. Call light in reach. Received pt at 0030  Pt AOx4, NSR, RA, VSS  Q2 Neuro until 0800. See flowsheets  IVF  D/c Planning: TP/OT/SLP, Echo & MRI/MRA  Call light within reach.   Needs currently met

## 2023-01-19 NOTE — PLAN OF CARE
NURSING DISCHARGE NOTE    Received care @ 1037   A&Ox4; no new neuro changes  RA, NS on tele  Echo and MRI completed  IV fluids infusing per order  Denies pain   Good PO intake    Discharged Home via Ambulatory. Accompanied by Support staff  Belongings Taken by patient/family. POC discussed with patient and physicians.

## 2023-01-19 NOTE — ED QUICK NOTES
No slurred speech noted at this time, PT able to make fist with R hand at this time, ok to swallow pills and eat per ERMD

## 2023-01-19 NOTE — ED QUICK NOTES
Orders for admission, patient is aware of plan and ready to go upstairs. Any questions, please call ED RN Myah at extension [phone].      Patient Covid vaccination status: Fully vaccinated     COVID Test Ordered in ED: Rapid SARS-CoV-2 by PCR    COVID Suspicion at Admission: Low clinical suspicion for COVID    Running Infusions:  None    Mental Status/LOC at time of transport: A&Ox3    Other pertinent information: PT does seem to experience same symptoms in past, has seen neuro in past  CIWA score: N/A   NIH score:  4

## 2023-02-01 ENCOUNTER — OFFICE VISIT (OUTPATIENT)
Dept: INTERNAL MEDICINE CLINIC | Facility: CLINIC | Age: 58
End: 2023-02-01
Payer: COMMERCIAL

## 2023-02-01 VITALS
HEIGHT: 65 IN | RESPIRATION RATE: 18 BRPM | WEIGHT: 157 LBS | DIASTOLIC BLOOD PRESSURE: 62 MMHG | HEART RATE: 66 BPM | SYSTOLIC BLOOD PRESSURE: 114 MMHG | OXYGEN SATURATION: 100 % | BODY MASS INDEX: 26.16 KG/M2

## 2023-02-01 DIAGNOSIS — R47.1 DYSARTHRIA: Primary | ICD-10-CM

## 2023-02-01 DIAGNOSIS — E78.00 PURE HYPERCHOLESTEROLEMIA: ICD-10-CM

## 2023-02-01 DIAGNOSIS — Z72.0 TOBACCO ABUSE: ICD-10-CM

## 2023-02-01 DIAGNOSIS — Z86.010 HISTORY OF COLON POLYPS: ICD-10-CM

## 2023-02-01 DIAGNOSIS — D24.1 FIBROADENOMA OF RIGHT BREAST: ICD-10-CM

## 2023-02-01 PROBLEM — R39.89 BLADDER PAIN: Status: RESOLVED | Noted: 2021-02-17 | Resolved: 2023-02-01

## 2023-02-01 PROBLEM — Z86.0100 HISTORY OF COLON POLYPS: Status: ACTIVE | Noted: 2023-02-01

## 2023-02-01 PROCEDURE — 3008F BODY MASS INDEX DOCD: CPT | Performed by: PHYSICIAN ASSISTANT

## 2023-02-01 PROCEDURE — 99214 OFFICE O/P EST MOD 30 MIN: CPT | Performed by: PHYSICIAN ASSISTANT

## 2023-02-01 PROCEDURE — 3074F SYST BP LT 130 MM HG: CPT | Performed by: PHYSICIAN ASSISTANT

## 2023-02-01 PROCEDURE — 3078F DIAST BP <80 MM HG: CPT | Performed by: PHYSICIAN ASSISTANT

## 2023-02-01 RX ORDER — ATORVASTATIN CALCIUM 10 MG/1
10 TABLET, FILM COATED ORAL NIGHTLY
Qty: 90 TABLET | Refills: 0 | Status: SHIPPED | OUTPATIENT
Start: 2023-02-01

## 2023-02-01 NOTE — PATIENT INSTRUCTIONS
Augustu 2022: Roel Jerome,  Since you have not seen Dr. Barbara Alicia since November 2020, you are due for a clinical exam before receiving any imaging orders. Please call the office to schedule an appointment.   [phone] option 1  Thanks,  ESCOBAR Felipe

## 2023-02-09 ENCOUNTER — OFFICE VISIT (OUTPATIENT)
Dept: ORTHOPEDICS CLINIC | Facility: CLINIC | Age: 58
End: 2023-02-09
Payer: OTHER MISCELLANEOUS

## 2023-02-09 VITALS — HEIGHT: 69 IN | BODY MASS INDEX: 23.4 KG/M2 | WEIGHT: 158 LBS

## 2023-02-09 DIAGNOSIS — S56.519A PARTIAL TEAR OF COMMON EXTENSOR TENDON OF ELBOW: Primary | ICD-10-CM

## 2023-02-09 PROCEDURE — 99213 OFFICE O/P EST LOW 20 MIN: CPT | Performed by: ORTHOPAEDIC SURGERY

## 2023-02-09 PROCEDURE — 3008F BODY MASS INDEX DOCD: CPT | Performed by: ORTHOPAEDIC SURGERY

## 2023-02-09 NOTE — TELEPHONE ENCOUNTER
Jaci Romberg, PA  You 32 minutes ago (12:49 PM)     ER    Hi! She just needs the therapy phone number at [facility]. I Told her about the different locations [facility] has that's all. Thanks!     Message text
OT order placed today. Pt was expecting a couple of outside facilities recommended? Were there other placed aside from [facility] you recommend for therapy? Thanks!
Patient states that during her visit with Tali Landers, she was suppose to receive a couple of phone numbers to different physical therapy locations. Please call back patient with info.
Spoke with pt, gave both jena ([phone]) and anisa ([phone]) numbers.
2 times/day

## 2023-02-23 ENCOUNTER — TELEPHONE (OUTPATIENT)
Dept: INTERNAL MEDICINE CLINIC | Facility: CLINIC | Age: 58
End: 2023-02-23

## 2023-02-23 DIAGNOSIS — Z12.31 VISIT FOR SCREENING MAMMOGRAM: Primary | ICD-10-CM

## 2023-02-23 DIAGNOSIS — Z80.3 FAMILY HISTORY OF BREAST CANCER: ICD-10-CM

## 2023-02-23 DIAGNOSIS — Z98.890 HISTORY OF BREAST BIOPSY: ICD-10-CM

## 2023-02-23 NOTE — TELEPHONE ENCOUNTER
Please place order for Mammogram.  Patient states that she doesn't do well with Mammograms because of her history of benign tumors. Patient states it's best to do an ultrasound? Please send patient a [facility] when order is in.

## 2023-02-23 NOTE — TELEPHONE ENCOUNTER
She was wanting that at [facility] as well. Please remind pt of my plan as outlined in my OV note. She will need to schedule f/u visit with Phillip to outline the plan in writing for imaging in the future. My impression is that she is due for mammogram not US. Will need to see what Phillip recommends.

## 2023-02-24 NOTE — TELEPHONE ENCOUNTER
Patient called [facility] office yesterday and was told to have PCP place order for imaging to be discussed during her visit- they are scheduling out to July currently. She is ok with mammogram now and if additional imaging is recommended moving forward she'll address that then. I don't see anything in a TE regarding recommendation to discuss with PCP. She is asking if [facility] will order mammogram for now? Patient asking for clinica to send response on [place] as she is at work and not able to talk.

## 2023-03-02 ENCOUNTER — OFFICE VISIT (OUTPATIENT)
Dept: SURGERY | Facility: CLINIC | Age: 58
End: 2023-03-02
Payer: COMMERCIAL

## 2023-03-02 VITALS
SYSTOLIC BLOOD PRESSURE: 117 MMHG | RESPIRATION RATE: 16 BRPM | OXYGEN SATURATION: 98 % | HEART RATE: 87 BPM | DIASTOLIC BLOOD PRESSURE: 75 MMHG | TEMPERATURE: 98 F

## 2023-03-02 DIAGNOSIS — N64.4 BREAST PAIN, LEFT: Primary | ICD-10-CM

## 2023-03-02 DIAGNOSIS — N64.4 BREAST PAIN, RIGHT: ICD-10-CM

## 2023-03-02 PROCEDURE — 3074F SYST BP LT 130 MM HG: CPT

## 2023-03-02 PROCEDURE — 3078F DIAST BP <80 MM HG: CPT

## 2023-03-02 PROCEDURE — 99203 OFFICE O/P NEW LOW 30 MIN: CPT

## 2023-03-02 RX ORDER — ZINC GLUCONATE 50 MG
1 TABLET ORAL AS DIRECTED
COMMUNITY

## 2023-03-02 RX ORDER — TRAMADOL HYDROCHLORIDE 50 MG/1
1 TABLET ORAL AS NEEDED
COMMUNITY

## 2023-03-20 ENCOUNTER — HOSPITAL ENCOUNTER (OUTPATIENT)
Dept: MAMMOGRAPHY | Age: 58
Discharge: HOME | End: 2023-03-20
Payer: COMMERCIAL

## 2023-03-20 ENCOUNTER — HOSPITAL ENCOUNTER (OUTPATIENT)
Dept: ULTRASOUND IMAGING | Age: 58
Discharge: HOME | End: 2023-03-20
Payer: COMMERCIAL

## 2023-03-20 DIAGNOSIS — N64.4 BREAST PAIN, RIGHT: ICD-10-CM

## 2023-03-20 DIAGNOSIS — N64.4 BREAST PAIN, LEFT: ICD-10-CM

## 2023-03-20 DIAGNOSIS — R92.8 ABNORMAL MAMMOGRAM: Primary | ICD-10-CM

## 2023-03-20 PROCEDURE — 77066 DX MAMMO INCL CAD BI: CPT

## 2023-03-20 PROCEDURE — 77062 BREAST TOMOSYNTHESIS BI: CPT

## 2023-03-20 PROCEDURE — 76642 ULTRASOUND BREAST LIMITED: CPT

## 2023-03-20 NOTE — IMAGING NOTE
This [facility] RN assisted Dr. Sarina Radford with recommendation for a left 1 site ultrasound guided biopsy. Procedure reviewed and all questions answered. Emotional and educational support given. On the day of the biopsy, pt instructed to take Tylenol 1000mg PO, eat a light meal & bring or wear a sports bra. Post biopsy care also reviewed with pt to include NO lifting more than 5lbs, no exercising or housework (limit upper body movement) for 24-48 hrs post biopsy. Pt denies blood thinners, bleeding disorders, liver disease, and chemo. Pt verbalized understanding. Our breast center schedulers will be calling to schedule an appt that is convenient for pt.

## 2023-03-31 ENCOUNTER — HOSPITAL ENCOUNTER (OUTPATIENT)
Dept: MAMMOGRAPHY | Age: 58
Discharge: HOME | End: 2023-03-31
Payer: COMMERCIAL

## 2023-03-31 ENCOUNTER — HOSPITAL ENCOUNTER (OUTPATIENT)
Dept: ULTRASOUND IMAGING | Age: 58
Discharge: HOME | End: 2023-03-31
Payer: COMMERCIAL

## 2023-03-31 DIAGNOSIS — R92.8 ABNORMAL MAMMOGRAM: ICD-10-CM

## 2023-03-31 PROCEDURE — 88305 TISSUE EXAM BY PATHOLOGIST: CPT

## 2023-03-31 PROCEDURE — 19083 BX BREAST 1ST LESION US IMAG: CPT

## 2023-03-31 PROCEDURE — 77065 DX MAMMO INCL CAD UNI: CPT

## 2023-04-18 ENCOUNTER — TELEMEDICINE (OUTPATIENT)
Dept: INTERNAL MEDICINE CLINIC | Facility: CLINIC | Age: 58
End: 2023-04-18
Payer: COMMERCIAL

## 2023-04-18 DIAGNOSIS — U07.1 COVID-19: Primary | ICD-10-CM

## 2023-04-18 PROCEDURE — 99212 OFFICE O/P EST SF 10 MIN: CPT | Performed by: INTERNAL MEDICINE

## 2023-05-07 DIAGNOSIS — E78.00 PURE HYPERCHOLESTEROLEMIA: ICD-10-CM

## 2023-05-08 RX ORDER — ATORVASTATIN CALCIUM 10 MG/1
TABLET, FILM COATED ORAL
Qty: 90 TABLET | Refills: 0 | Status: SHIPPED | OUTPATIENT
Start: 2023-05-08

## 2023-05-08 NOTE — TELEPHONE ENCOUNTER
PASSED per protocol, refill sent.     Last PE-- 2-1-2023-cb    Future Appointments   Date Time Provider Edilberto Ochoa   7/13/2023  9:15 AM Francis Mccoy MD [facility]

## 2023-05-14 ENCOUNTER — PATIENT MESSAGE (OUTPATIENT)
Dept: ORTHOPEDICS CLINIC | Facility: CLINIC | Age: 58
End: 2023-05-14

## 2023-05-15 NOTE — TELEPHONE ENCOUNTER
From: Clive Leach  To: Shruti Adan MD  Sent: 5/14/2023 8:42 PM CDT  Subject: Elbow     My elbow is still hurting. I know I have an appointment in a couple of months, what can I do in the meantime to help with the pain?

## 2023-05-17 NOTE — TELEPHONE ENCOUNTER
Ramu Underwood MD  [facility] 14 hours ago (4:44 PM)     Please schedule in the next 1-2 weeks to see me      Can we please contact this patient and get her on Dr. Thomas Carroll schedule with in the next 1-2 weeks? Thank you!

## 2023-05-19 NOTE — TELEPHONE ENCOUNTER
Scheduled. Please note that the patient said it is her LT ELBOW. I confirmed with the patient that it was her lt that she had sx on and she said yes and she has never had any issue with the RT ELBOW, however all of her documentation states it is the RT ELBOW as well as all of her imaging. .     Future Appointments   Date Time Provider Edilberto Ochoa   5/22/2023  1:00 PM Joya Sandoval MD [id number]

## 2023-05-19 NOTE — TELEPHONE ENCOUNTER
Third voicemail left for patient to call office to schedule appointment. Patients voicemail does state that she does not check message often.

## 2023-05-22 ENCOUNTER — OFFICE VISIT (OUTPATIENT)
Dept: ORTHOPEDICS CLINIC | Facility: CLINIC | Age: 58
End: 2023-05-22
Payer: OTHER MISCELLANEOUS

## 2023-05-22 VITALS — WEIGHT: 158 LBS | BODY MASS INDEX: 23.95 KG/M2 | HEIGHT: 68 IN

## 2023-05-22 DIAGNOSIS — M77.11 LATERAL EPICONDYLITIS OF RIGHT ELBOW: Primary | ICD-10-CM

## 2023-05-22 PROCEDURE — 3008F BODY MASS INDEX DOCD: CPT | Performed by: ORTHOPAEDIC SURGERY

## 2023-05-22 PROCEDURE — 99213 OFFICE O/P EST LOW 20 MIN: CPT | Performed by: ORTHOPAEDIC SURGERY

## 2023-06-06 ENCOUNTER — HOSPITAL ENCOUNTER (OUTPATIENT)
Dept: MRI IMAGING | Age: 58
Discharge: HOME | End: 2023-06-06
Attending: ORTHOPAEDIC SURGERY
Payer: COMMERCIAL

## 2023-06-06 DIAGNOSIS — M77.11 LATERAL EPICONDYLITIS OF RIGHT ELBOW: ICD-10-CM

## 2023-06-06 PROCEDURE — 73221 MRI JOINT UPR EXTREM W/O DYE: CPT | Performed by: ORTHOPAEDIC SURGERY

## 2023-08-31 ENCOUNTER — OFFICE VISIT (OUTPATIENT)
Dept: ORTHOPEDICS CLINIC | Facility: CLINIC | Age: 58
End: 2023-08-31
Payer: OTHER MISCELLANEOUS

## 2023-08-31 VITALS — WEIGHT: 158 LBS | HEIGHT: 68 IN | BODY MASS INDEX: 23.95 KG/M2

## 2023-08-31 DIAGNOSIS — M77.11 LATERAL EPICONDYLITIS OF RIGHT ELBOW: Primary | ICD-10-CM

## 2023-08-31 RX ORDER — BETAMETHASONE SODIUM PHOSPHATE AND BETAMETHASONE ACETATE 3; 3 MG/ML; MG/ML
6 INJECTION, SUSPENSION INTRA-ARTICULAR; INTRALESIONAL; INTRAMUSCULAR; SOFT TISSUE ONCE
Status: COMPLETED | OUTPATIENT
Start: 2023-08-31 | End: 2023-08-31

## 2023-08-31 RX ADMIN — BETAMETHASONE SODIUM PHOSPHATE AND BETAMETHASONE ACETATE 6 MG: 3; 3 INJECTION, SUSPENSION INTRA-ARTICULAR; INTRALESIONAL; INTRAMUSCULAR; SOFT TISSUE at 15:40:00

## 2023-09-07 ENCOUNTER — TELEPHONE (OUTPATIENT)
Dept: INTERNAL MEDICINE CLINIC | Facility: CLINIC | Age: 58
End: 2023-09-07

## 2023-09-16 ENCOUNTER — LAB ENCOUNTER (OUTPATIENT)
Dept: LAB | Age: 58
End: 2023-09-16
Attending: PHYSICIAN ASSISTANT
Payer: COMMERCIAL

## 2023-09-16 DIAGNOSIS — E78.00 PURE HYPERCHOLESTEROLEMIA: ICD-10-CM

## 2023-09-16 DIAGNOSIS — R47.1 DYSARTHRIA: ICD-10-CM

## 2023-09-16 LAB
ALBUMIN SERPL-MCNC: 3.9 G/DL (ref 3.4–5)
ALBUMIN/GLOB SERPL: 1.1 {RATIO} (ref 1–2)
ALP LIVER SERPL-CCNC: 99 U/L
ALT SERPL-CCNC: 18 U/L
ANION GAP SERPL CALC-SCNC: 4 MMOL/L (ref 0–18)
AST SERPL-CCNC: 8 U/L (ref 15–37)
BILIRUB SERPL-MCNC: 0.5 MG/DL (ref 0.1–2)
BUN BLD-MCNC: 15 MG/DL (ref 7–18)
CALCIUM BLD-MCNC: 9.2 MG/DL (ref 8.5–10.1)
CHLORIDE SERPL-SCNC: 110 MMOL/L (ref 98–112)
CHOLEST SERPL-MCNC: 226 MG/DL (ref ?–200)
CO2 SERPL-SCNC: 26 MMOL/L (ref 21–32)
CREAT BLD-MCNC: 0.91 MG/DL
EGFRCR SERPLBLD CKD-EPI 2021: 74 ML/MIN/1.73M2 (ref 60–?)
FASTING PATIENT LIPID ANSWER: YES
FASTING STATUS PATIENT QL REPORTED: YES
GLOBULIN PLAS-MCNC: 3.6 G/DL (ref 2.8–4.4)
GLUCOSE BLD-MCNC: 89 MG/DL (ref 70–99)
HDLC SERPL-MCNC: 70 MG/DL (ref 40–59)
LDLC SERPL CALC-MCNC: 137 MG/DL (ref ?–100)
NONHDLC SERPL-MCNC: 156 MG/DL (ref ?–130)
OSMOLALITY SERPL CALC.SUM OF ELEC: 290 MOSM/KG (ref 275–295)
POTASSIUM SERPL-SCNC: 4.7 MMOL/L (ref 3.5–5.1)
PROT SERPL-MCNC: 7.5 G/DL (ref 6.4–8.2)
SODIUM SERPL-SCNC: 140 MMOL/L (ref 136–145)
T4 FREE SERPL-MCNC: 0.8 NG/DL (ref 0.8–1.7)
TRIGL SERPL-MCNC: 111 MG/DL (ref 30–149)
TSI SER-ACNC: 4.1 MIU/ML (ref 0.36–3.74)
VLDLC SERPL CALC-MCNC: 20 MG/DL (ref 0–30)

## 2023-09-16 PROCEDURE — 36415 COLL VENOUS BLD VENIPUNCTURE: CPT

## 2023-09-16 PROCEDURE — 80053 COMPREHEN METABOLIC PANEL: CPT

## 2023-09-16 PROCEDURE — 84443 ASSAY THYROID STIM HORMONE: CPT

## 2023-09-16 PROCEDURE — 80061 LIPID PANEL: CPT

## 2023-09-16 PROCEDURE — 84439 ASSAY OF FREE THYROXINE: CPT

## 2023-09-21 NOTE — TELEPHONE ENCOUNTER
Future Appointments   Date Time Provider Edilberto Ochoa   9/26/2023  3:20 PM Rojas Bell,  [facility]
JUN and sent [facility] to schedule CPE
Rojas Bell,   [facility]  Needs physical and care gaps
normal...

## 2023-09-26 ENCOUNTER — OFFICE VISIT (OUTPATIENT)
Dept: INTERNAL MEDICINE CLINIC | Facility: CLINIC | Age: 58
End: 2023-09-26
Payer: COMMERCIAL

## 2023-09-26 ENCOUNTER — PATIENT MESSAGE (OUTPATIENT)
Dept: INTERNAL MEDICINE CLINIC | Facility: CLINIC | Age: 58
End: 2023-09-26

## 2023-09-26 VITALS
SYSTOLIC BLOOD PRESSURE: 122 MMHG | RESPIRATION RATE: 16 BRPM | BODY MASS INDEX: 24.1 KG/M2 | OXYGEN SATURATION: 99 % | HEART RATE: 71 BPM | DIASTOLIC BLOOD PRESSURE: 78 MMHG | WEIGHT: 155.38 LBS | TEMPERATURE: 98 F | HEIGHT: 67.5 IN

## 2023-09-26 DIAGNOSIS — Z00.00 WELLNESS EXAMINATION: Primary | ICD-10-CM

## 2023-09-26 DIAGNOSIS — H54.7 DECREASED VISUAL ACUITY: ICD-10-CM

## 2023-09-26 DIAGNOSIS — Z72.0 TOBACCO ABUSE: ICD-10-CM

## 2023-09-26 DIAGNOSIS — Z86.010 PERSONAL HISTORY OF COLONIC POLYPS: ICD-10-CM

## 2023-09-26 DIAGNOSIS — Z80.3 FAMILY HISTORY OF BREAST CANCER: ICD-10-CM

## 2023-09-26 DIAGNOSIS — E78.00 PURE HYPERCHOLESTEROLEMIA: ICD-10-CM

## 2023-09-26 DIAGNOSIS — D24.1 FIBROADENOMA OF RIGHT BREAST: ICD-10-CM

## 2023-09-26 DIAGNOSIS — R41.3 MEMORY LOSS: ICD-10-CM

## 2023-09-26 DIAGNOSIS — R29.898 WEAKNESS OF LEFT LOWER EXTREMITY: ICD-10-CM

## 2023-09-26 DIAGNOSIS — R47.1 DYSARTHRIA: ICD-10-CM

## 2023-09-26 DIAGNOSIS — G95.89 SYRINX, PERSISTENT CENTRAL CANAL (HCC): ICD-10-CM

## 2023-09-26 DIAGNOSIS — L82.1 SEBORRHEIC KERATOSES: ICD-10-CM

## 2023-09-26 PROCEDURE — 3074F SYST BP LT 130 MM HG: CPT | Performed by: INTERNAL MEDICINE

## 2023-09-26 PROCEDURE — 3078F DIAST BP <80 MM HG: CPT | Performed by: INTERNAL MEDICINE

## 2023-09-26 PROCEDURE — 99214 OFFICE O/P EST MOD 30 MIN: CPT | Performed by: INTERNAL MEDICINE

## 2023-09-26 PROCEDURE — 3008F BODY MASS INDEX DOCD: CPT | Performed by: INTERNAL MEDICINE

## 2023-09-26 PROCEDURE — 99396 PREV VISIT EST AGE 40-64: CPT | Performed by: INTERNAL MEDICINE

## 2023-09-26 RX ORDER — ATORVASTATIN CALCIUM 10 MG/1
10 TABLET, FILM COATED ORAL NIGHTLY
Qty: 90 TABLET | Refills: 1 | Status: SHIPPED | OUTPATIENT
Start: 2023-09-26

## 2023-09-26 RX ORDER — ATORVASTATIN CALCIUM 10 MG/1
10 TABLET, FILM COATED ORAL NIGHTLY
Qty: 90 TABLET | Refills: 0 | Status: SHIPPED | OUTPATIENT
Start: 2023-09-26 | End: 2023-09-26

## 2023-09-27 PROBLEM — Z80.3 FAMILY HISTORY OF BREAST CANCER: Status: ACTIVE | Noted: 2023-09-27

## 2023-09-27 PROBLEM — G95.89 SYRINX, PERSISTENT CENTRAL CANAL (HCC): Status: ACTIVE | Noted: 2023-09-27

## 2023-09-27 PROBLEM — D24.1 FIBROADENOMA OF RIGHT BREAST: Status: ACTIVE | Noted: 2023-09-27

## 2023-09-28 NOTE — TELEPHONE ENCOUNTER
From: Frances Lieberman  To: Rojas Abrams  Sent: 9/26/2023 5:41 PM CDT  Subject: One more concern     I forgot one more thing to add to my little list.  For almost a year now, my armpits smell bad. They smell like ERWIN all the time, even when I get out of the shower. I've tried different soaps, scrubbers, etc. Why is this happening? It's very embarrassing.

## 2023-09-28 NOTE — TELEPHONE ENCOUNTER
From UpToDate:    \"Affected areas should be washed daily. Excessive washing should be discouraged to avoid skin irritation. Products that reduce bacterial colonization in the affected area can be expected to help with odor. Antibacterial washes and bar soaps are widely available. Both acids, such as alpha- and beta-hydroxy acids, and bases, such as baking soda, may be helpful, though skin irritation may develop. There are various herbal deodorants and other products that may have antibacterial or other properties that decrease odor. Charcoal-based topicals may also help absorb odor-causing chemicals. Wearing of absorbent clothing, particularly cotton or materials that wick moisture away from the skin, may be helpful. Perspiration-soaked clothing should be promptly removed, and clothing should be changed at least daily. \"     Here are some recommendations for now.  Can follow up with me if not improved

## 2023-09-29 ENCOUNTER — HOSPITAL ENCOUNTER (OUTPATIENT)
Dept: GENERAL RADIOLOGY | Age: 58
Discharge: HOME | End: 2023-09-29
Attending: INTERNAL MEDICINE
Payer: COMMERCIAL

## 2023-09-29 DIAGNOSIS — R29.898 WEAKNESS OF LEFT LOWER EXTREMITY: ICD-10-CM

## 2023-09-29 PROCEDURE — 73502 X-RAY EXAM HIP UNI 2-3 VIEWS: CPT | Performed by: INTERNAL MEDICINE

## 2023-10-03 ENCOUNTER — V-VISIT (OUTPATIENT)
Dept: URGENT CARE | Age: 58
End: 2023-10-03

## 2023-10-03 VITALS
DIASTOLIC BLOOD PRESSURE: 70 MMHG | TEMPERATURE: 97.1 F | HEART RATE: 90 BPM | WEIGHT: 155 LBS | OXYGEN SATURATION: 98 % | SYSTOLIC BLOOD PRESSURE: 122 MMHG | HEIGHT: 68 IN | BODY MASS INDEX: 23.49 KG/M2 | RESPIRATION RATE: 18 BRPM

## 2023-10-03 DIAGNOSIS — N39.0 ACUTE UTI: Primary | ICD-10-CM

## 2023-10-03 DIAGNOSIS — R31.29 HEMATURIA, MICROSCOPIC: ICD-10-CM

## 2023-10-03 LAB
APPEARANCE, POC: CLEAR
BILIRUBIN, POC: NEGATIVE
COLOR, POC: YELLOW
GLUCOSE UR-MCNC: NEGATIVE MG/DL
KETONES, POC: NEGATIVE MG/DL
NITRITE, POC: NEGATIVE
OCCULT BLOOD, POC: ABNORMAL
PH UR: 5 [PH] (ref 5–7)
PROT UR-MCNC: ABNORMAL MG/DL
SP GR UR: 1 (ref 1–1.03)
UROBILINOGEN UR-MCNC: 1 MG/DL (ref 0–1)
WBC (LEUKOCYTE) ESTERASE, POC: ABNORMAL

## 2023-10-03 PROCEDURE — 87088 URINE BACTERIA CULTURE: CPT | Performed by: CLINICAL MEDICAL LABORATORY

## 2023-10-03 PROCEDURE — 99203 OFFICE O/P NEW LOW 30 MIN: CPT | Performed by: NURSE PRACTITIONER

## 2023-10-03 PROCEDURE — 87086 URINE CULTURE/COLONY COUNT: CPT | Performed by: CLINICAL MEDICAL LABORATORY

## 2023-10-03 PROCEDURE — 81002 URINALYSIS NONAUTO W/O SCOPE: CPT | Performed by: NURSE PRACTITIONER

## 2023-10-03 RX ORDER — ATORVASTATIN CALCIUM 10 MG/1
1 TABLET, FILM COATED ORAL NIGHTLY
COMMUNITY
Start: 2023-09-26

## 2023-10-03 RX ORDER — NITROFURANTOIN 25; 75 MG/1; MG/1
100 CAPSULE ORAL 2 TIMES DAILY
Qty: 10 CAPSULE | Refills: 0 | Status: SHIPPED | OUTPATIENT
Start: 2023-10-03 | End: 2023-10-08

## 2023-10-03 ASSESSMENT — PAIN SCALES - GENERAL: PAINLEVEL: 8

## 2023-10-04 ENCOUNTER — HOSPITAL ENCOUNTER (OUTPATIENT)
Age: 58
Discharge: HOME | End: 2023-10-04

## 2023-10-04 ENCOUNTER — TELEPHONE (OUTPATIENT)
Dept: URGENT CARE | Age: 58
End: 2023-10-04

## 2023-10-04 ENCOUNTER — NURSE TRIAGE (OUTPATIENT)
Dept: TELEHEALTH | Age: 58
End: 2023-10-04

## 2023-10-04 ENCOUNTER — APPOINTMENT (OUTPATIENT)
Dept: CT IMAGING | Age: 58
End: 2023-10-04
Attending: PHYSICIAN ASSISTANT

## 2023-10-04 VITALS
TEMPERATURE: 98 F | RESPIRATION RATE: 16 BRPM | DIASTOLIC BLOOD PRESSURE: 61 MMHG | HEART RATE: 84 BPM | OXYGEN SATURATION: 97 % | SYSTOLIC BLOOD PRESSURE: 110 MMHG

## 2023-10-04 DIAGNOSIS — R30.0 DYSURIA: Primary | ICD-10-CM

## 2023-10-04 DIAGNOSIS — R10.9 BILATERAL FLANK PAIN: ICD-10-CM

## 2023-10-04 DIAGNOSIS — R10.2 PELVIC PRESSURE IN FEMALE: ICD-10-CM

## 2023-10-04 LAB
BUN BLD-MCNC: 15 MG/DL (ref 7–18)
CHLORIDE BLD-SCNC: 106 MMOL/L (ref 98–112)
CO2 BLD-SCNC: 24 MMOL/L (ref 21–32)
CREAT BLD-MCNC: 0.9 MG/DL
EGFRCR SERPLBLD CKD-EPI 2021: 75 ML/MIN/1.73M2 (ref 60–?)
GLUCOSE BLD-MCNC: 103 MG/DL (ref 70–99)
GLUCOSE UR STRIP-MCNC: 100 MG/DL
HCT VFR BLD CALC: 38 %
ISTAT IONIZED CALCIUM FOR CHEM 8: 1.19 MMOL/L (ref 1.12–1.32)
NITRITE UR QL STRIP: POSITIVE
PH UR STRIP: 5 [PH]
POTASSIUM BLD-SCNC: 3.8 MMOL/L (ref 3.6–5.1)
PROT UR STRIP-MCNC: 100 MG/DL
SODIUM BLD-SCNC: 142 MMOL/L (ref 136–145)
SP GR UR STRIP: 1.02
UROBILINOGEN UR STRIP-ACNC: 4 MG/DL

## 2023-10-04 PROCEDURE — 80047 BASIC METABLC PNL IONIZED CA: CPT

## 2023-10-04 PROCEDURE — 81002 URINALYSIS NONAUTO W/O SCOPE: CPT

## 2023-10-04 PROCEDURE — 99214 OFFICE O/P EST MOD 30 MIN: CPT

## 2023-10-04 PROCEDURE — 87086 URINE CULTURE/COLONY COUNT: CPT | Performed by: PHYSICIAN ASSISTANT

## 2023-10-04 PROCEDURE — 96360 HYDRATION IV INFUSION INIT: CPT

## 2023-10-04 PROCEDURE — 74176 CT ABD & PELVIS W/O CONTRAST: CPT | Performed by: PHYSICIAN ASSISTANT

## 2023-10-04 RX ORDER — CIPROFLOXACIN 500 MG/1
500 TABLET, FILM COATED ORAL 2 TIMES DAILY
Qty: 14 TABLET | Refills: 0 | Status: SHIPPED | OUTPATIENT
Start: 2023-10-04 | End: 2023-10-11

## 2023-10-04 RX ORDER — SODIUM CHLORIDE 9 MG/ML
1000 INJECTION, SOLUTION INTRAVENOUS ONCE
Status: COMPLETED | OUTPATIENT
Start: 2023-10-04 | End: 2023-10-04

## 2023-10-04 NOTE — ED INITIAL ASSESSMENT (HPI)
Pt with 5 day history of pelvic pain, seen at a [facility] yesterday and dx with UTI. Pt states she has taken x2 doses of abx and azos but still not relief of pain. Pt c/o suprapubic pain and right flank pain. Denies burning; describes the pain being in the pelvic region. Denies vaginal discharge.

## 2023-10-04 NOTE — DISCHARGE INSTRUCTIONS
Please return to the ER/clinic if symptoms worsen. Follow-up with your PCP in 24-48 hours as needed. Drink plenty of fluids. Take the full course antibiotics as prescribed in tandem with a probiotic daily. We will contact you with the results of the culture however it may be potentially corrupted with the aero start of Macrobid. Do not start any new exercise resumes. If anything changes i.e. increasing flank pain fevers etc. go directly to the emergency room. Otherwise close follow-up with your primary care physician for further evaluation and treatment.

## 2023-10-05 LAB — BACTERIA UR CULT: ABNORMAL

## 2023-10-09 ENCOUNTER — OFFICE VISIT (OUTPATIENT)
Dept: UROLOGY | Facility: CLINIC | Age: 58
End: 2023-10-09
Attending: PHYSICIAN ASSISTANT
Payer: COMMERCIAL

## 2023-10-09 VITALS
BODY MASS INDEX: 24.1 KG/M2 | WEIGHT: 155.38 LBS | DIASTOLIC BLOOD PRESSURE: 60 MMHG | HEIGHT: 67.5 IN | SYSTOLIC BLOOD PRESSURE: 118 MMHG

## 2023-10-09 DIAGNOSIS — N95.2 POSTMENOPAUSAL ATROPHIC VAGINITIS: ICD-10-CM

## 2023-10-09 DIAGNOSIS — N39.3 FEMALE STRESS INCONTINENCE: ICD-10-CM

## 2023-10-09 DIAGNOSIS — N39.41 URGE URINARY INCONTINENCE: Primary | ICD-10-CM

## 2023-10-09 DIAGNOSIS — M62.89 PELVIC FLOOR TENSION: ICD-10-CM

## 2023-10-09 DIAGNOSIS — N81.84 PELVIC MUSCLE WASTING: ICD-10-CM

## 2023-10-09 DIAGNOSIS — R39.14 FEELING OF INCOMPLETE BLADDER EMPTYING: ICD-10-CM

## 2023-10-09 DIAGNOSIS — R39.89 BLADDER PAIN: ICD-10-CM

## 2023-10-09 LAB
BILIRUB UR QL STRIP.AUTO: NEGATIVE
CLARITY UR REFRACT.AUTO: CLEAR
COLOR UR AUTO: COLORLESS
GLUCOSE UR STRIP.AUTO-MCNC: NORMAL MG/DL
KETONES UR STRIP.AUTO-MCNC: NEGATIVE MG/DL
LEUKOCYTE ESTERASE UR QL STRIP.AUTO: NEGATIVE
NITRITE UR QL STRIP.AUTO: NEGATIVE
PH UR STRIP.AUTO: 6 [PH] (ref 5–8)
PROT UR STRIP.AUTO-MCNC: NEGATIVE MG/DL
RBC UR QL AUTO: NEGATIVE
SP GR UR STRIP.AUTO: 1 (ref 1–1.03)
UROBILINOGEN UR STRIP.AUTO-MCNC: NORMAL MG/DL

## 2023-10-09 PROCEDURE — 99212 OFFICE O/P EST SF 10 MIN: CPT

## 2023-10-09 PROCEDURE — 81003 URINALYSIS AUTO W/O SCOPE: CPT | Performed by: PHYSICIAN ASSISTANT

## 2023-10-09 RX ORDER — ESTRADIOL 0.1 MG/G
CREAM VAGINAL
Qty: 42.5 G | Refills: 3 | Status: SHIPPED | OUTPATIENT
Start: 2023-10-09

## 2023-10-25 ENCOUNTER — TELEPHONE (OUTPATIENT)
Dept: UROLOGY | Facility: CLINIC | Age: 58
End: 2023-10-25

## 2023-10-30 ENCOUNTER — OFFICE VISIT (OUTPATIENT)
Dept: UROLOGY | Facility: CLINIC | Age: 58
End: 2023-10-30
Attending: OBSTETRICS & GYNECOLOGY

## 2023-10-30 VITALS
WEIGHT: 155 LBS | DIASTOLIC BLOOD PRESSURE: 68 MMHG | BODY MASS INDEX: 24 KG/M2 | RESPIRATION RATE: 16 BRPM | SYSTOLIC BLOOD PRESSURE: 114 MMHG

## 2023-10-30 DIAGNOSIS — N39.3 FEMALE STRESS INCONTINENCE: ICD-10-CM

## 2023-10-30 DIAGNOSIS — R39.89 BLADDER PAIN: ICD-10-CM

## 2023-10-30 DIAGNOSIS — N39.41 URGE URINARY INCONTINENCE: Primary | ICD-10-CM

## 2023-10-30 LAB
CONTROL RUN WITHIN 24 HOURS?: YES
LEUKOCYTE ESTERASE URINE: NEGATIVE
NITRITE URINE: NEGATIVE

## 2023-10-30 PROCEDURE — 51784 ANAL/URINARY MUSCLE STUDY: CPT

## 2023-10-30 PROCEDURE — 51729 CYSTOMETROGRAM W/VP&UP: CPT

## 2023-10-30 PROCEDURE — 51741 ELECTRO-UROFLOWMETRY FIRST: CPT

## 2023-10-30 PROCEDURE — 81002 URINALYSIS NONAUTO W/O SCOPE: CPT

## 2023-10-30 PROCEDURE — 51797 INTRAABDOMINAL PRESSURE TEST: CPT

## 2023-10-30 NOTE — PATIENT INSTRUCTIONS
[facility] UROGYNECOLOGY  Stellaiftikhar Serrano [address] [address]  [place] [address]: [phone]  FAX: [phone]       Urodynamic Testing Discharge Instructions: There are NO dietary or activity restrictions. You may resume your normal schedule. You may have mild discomfort for a few hours after your testing today. There may be some mild burning when you urinate or you may see some blood in your urine. These problems should not last more than 24 hours. The following suggestions may minimize any symptoms you experience. Drink 6-8 large glasses of water over the next 8 hours  A compress or sitz bath may be soothing  Tylenol or Ibuprofen may be taken as needed    If you experience any of the following, please call the office or, if after hours, the on-call physician at [phone]. Excessive pain  Bright red bloody discharge  Fever or chills  Continued urgency, frequency or burning with urination    Obtaining Test Results    Your urodynamic test will be interpreted by a specialist and available to the referring physician within 7-14 days. Patients in our clinic are given an appointment to come back to discuss the results and any appropriate treatment recommendations. Please do not hesitate to contact our office with any questions or concerns at [phone]. I acknowledge that I have received verbal and written discharge  instructions and that I understand these instructions clearly.     Patient Signature:    Date:

## 2023-10-30 NOTE — PROCEDURES
Patient here for urodynamic testing. Procedure explained and confirmed by patient. See evaluation form for results. Both verbal and written discharge instructions were given. Patient tolerated procedure well and will follow up with Dr. Kalee Arango on 2024 -is on a wait list for sooner follow up - although does have difficulty taking off work for [employer]. URODYNAMIC EVALUATION    PATIENT HISTORY:    Prolapse:  Yes  ESTEVAN:  Yes  UUI:  Yes most bothersome - feels she does not always have control - especially if she is having issues with bladder pain   Nocturia:  2  Frequency:  every 1-2 hours this varies - can wait longer if she is not having bladder pain  Sense of Incomplete Emptying:  Yes - feels she will dribble at times  Constipation:  No  Last void prior to UDS testin minutes  Current urge to void? Strong  OAB meds stopped prior to test?  NA  Other symptoms? Bladder pain - has had work up with urology in the past - will occasionally take [address] , but not always - refers to bladder pain as \" spells\" and reports that these occur sometimes once a week , other times more or less often - reports that urinary leakage and urgency and frequency is always worse during this time and reports she just \" waits it out\" - Did try Vesicare and Detrol for OAB in the past with no difference in symtpoms  Does not feel that the bladder pain is associated with dietary triggers - has gone thru PT in the past - reports this did not help  Started using Estrace vaginal cream >30    Surgery? [x]  No  []  Interested in surgery:   []  Yes, specify date:    Surgical folder provided?   []  Yes  [x]  No     PATIENT DIAGNOSIS:  Urge Incontinence N39.41 and Stress Incontinence N39.3    UDS PROCEDURAL FINDINGS:  Incomplete Bladder Emptying R39.14 and Detrusor Instability N31.9    MEDICATION: estradiol (ESTRACE) 0.1 MG/GM Vaginal Cream, Apply 1/2 gram vaginally 2 times per week., Disp: 42.5 g, Rfl: 3         ALLERGIES: Adhesive Tape and Dilaudid [Hydromorphone]      EXAM:  Urinalysis Dip:  Today's Results   Component Date Value    control run 10/30/2023 Yes     Blood Urine 10/30/2023 Trace (A)     Nitrite Urine 10/30/2023 Negative     Leukocyte esterase urine 10/30/2023 Negative       Urovesico Junction ( <3030 degrees ):  []  Mobile  [x]  Fixed    Perineal Sensation:  [x]  Normal  []  Abnormal    Additional Notes:    PROLAPSE:  []  Yes  [x]  No  Prolapse reduced for testing? []  Yes  []  No  []  Pessary  []  Manual  []  Half Speculum    Additional Notes:    UROFLOWMETRY:  Unreduced  Voided Volume:                        567     mL  Maximum Flow Rate:                         36       mL/sec  Average flow rate:                        9     mL/sec  Post-void Residual:                     85      mL  Pattern:  []  Normal  []  Poor flow     [x]  Intermittent  []  Other  Void:   [x]  Typical  []  Atypical    Additional Notes:    CYSTOMETRY:  Urethral Catheter:  Fr 7 / tdoc  Abd Catheter:     Fr 7 / tdoc   Infusion:  Water Rate 30 mL/min  Temp:  Room  Position:    Sit  [x]  Stand  []  Supine  First sensation:    105 mL  First desire to void:    197 mL  Strong desire to void:   300 mL  Maximum cystometric capacity:   400 mL  Detrusor Activity:  [x]  Unstable   []  Stable  Urge leakage? []  Yes []  No  Volume at 1st unhibited detrusor cont:   400 mL  Detrusor instability provoked by:    []  Spontaneous []  Coughing  [x]  Filling  []  Valsalva  []  Other    Additional Notes:  Patient reports discomfort with filling    URETHRAL FUNCTION:  Valsava (vesical) Leak Point Pressures:    Volume Leak Point Pressure Leak? Cough Valsalva      100mL 110 75    cm H2O []  Yes [x]  No   200mL 137 95    cm H2O []  Yes [x]  No   300mL 110 75    cm H2O []  Yes [x]  No   400mL 126 100 cm H2O []  Yes [x]  No     Genuine Stress Incontinence demonstrated?    []  Yes  [x]  No    Resting Urethral Pressure Profile:     Functional Urethral Length: 0.4 cm           0.4      cm     Maximum UCP:        60    cm           62   cm       PRESSURE/FLOW STUDY:  Unreduced  Voided volume:     102 + 250 mL BR void plus    mL  Maximum flow rate:      3  mL/sec  Pressure Detrusor (at maximum flow):        55    cm H2O  Post void residual:      174         mL  Voiding mechanism:  [x]  Abnormal  []  Normal  [x]  Strain to void   []  Weak detrusor  Void:   []  Typical   [x]  Atypical    Additional Notes: Intially emptied 18 ml - reports discomfort - Pves removed and volume to 100 mL - reports inability to continue void - Up to BR for additional 250 ml plus urine into toilet - still unsure if she emptied completely . Uroflowmetry, cystometry, and pressure / flow study were completed using calibrated electronic equipment and air charged transducers.     EMG:  During fill: Reactive    During flow study: Reactive    10/30/2023 3:04 PM     PERFORMED BY:  Marlin Park RN

## 2023-11-07 ENCOUNTER — HOSPITAL ENCOUNTER (OUTPATIENT)
Age: 58
Discharge: HOME | End: 2023-11-07
Attending: EMERGENCY MEDICINE
Payer: COMMERCIAL

## 2023-11-07 VITALS
HEART RATE: 75 BPM | TEMPERATURE: 98 F | DIASTOLIC BLOOD PRESSURE: 71 MMHG | HEIGHT: 67 IN | WEIGHT: 155 LBS | OXYGEN SATURATION: 97 % | RESPIRATION RATE: 18 BRPM | SYSTOLIC BLOOD PRESSURE: 111 MMHG | BODY MASS INDEX: 24.33 KG/M2

## 2023-11-07 DIAGNOSIS — H57.12 PAIN OF LEFT EYE: Primary | ICD-10-CM

## 2023-11-07 DIAGNOSIS — S05.8X2A CORNEAL INJURY OF LEFT EYE, INITIAL ENCOUNTER: ICD-10-CM

## 2023-11-07 PROCEDURE — 99213 OFFICE O/P EST LOW 20 MIN: CPT

## 2023-11-07 RX ORDER — TETRACAINE HYDROCHLORIDE 5 MG/ML
1 SOLUTION OPHTHALMIC ONCE
Status: COMPLETED | OUTPATIENT
Start: 2023-11-07 | End: 2023-11-07

## 2023-11-07 RX ORDER — POLYMYXIN B SULFATE AND TRIMETHOPRIM 1; 10000 MG/ML; [USP'U]/ML
1 SOLUTION OPHTHALMIC
Qty: 10 ML | Refills: 0 | Status: SHIPPED | OUTPATIENT
Start: 2023-11-07 | End: 2023-11-12

## 2023-11-07 NOTE — ED INITIAL ASSESSMENT (HPI)
Dr. Leslie Amor at bedside assessing. Patient here for evaluation of left eye pain. Patient states she is not up to date on her tetanus shot.

## 2023-12-13 ENCOUNTER — OFFICE VISIT (OUTPATIENT)
Dept: UROLOGY | Facility: CLINIC | Age: 58
End: 2023-12-13
Attending: OBSTETRICS & GYNECOLOGY
Payer: COMMERCIAL

## 2023-12-13 VITALS
HEIGHT: 67 IN | BODY MASS INDEX: 24.33 KG/M2 | WEIGHT: 155 LBS | DIASTOLIC BLOOD PRESSURE: 60 MMHG | SYSTOLIC BLOOD PRESSURE: 96 MMHG

## 2023-12-13 DIAGNOSIS — N32.81 URGENCY-FREQUENCY SYNDROME: ICD-10-CM

## 2023-12-13 DIAGNOSIS — R39.89 BLADDER PAIN: Primary | ICD-10-CM

## 2023-12-13 DIAGNOSIS — M62.89 PELVIC FLOOR TENSION: ICD-10-CM

## 2023-12-13 PROCEDURE — 99212 OFFICE O/P EST SF 10 MIN: CPT

## 2023-12-21 ENCOUNTER — OFFICE VISIT (OUTPATIENT)
Dept: UROLOGY | Facility: CLINIC | Age: 58
End: 2023-12-21
Attending: OBSTETRICS & GYNECOLOGY
Payer: COMMERCIAL

## 2023-12-21 VITALS — RESPIRATION RATE: 20 BRPM | BODY MASS INDEX: 24.33 KG/M2 | HEIGHT: 67 IN | WEIGHT: 155 LBS

## 2023-12-21 DIAGNOSIS — N39.41 URGE URINARY INCONTINENCE: ICD-10-CM

## 2023-12-21 DIAGNOSIS — R39.89 BLADDER PAIN: Primary | ICD-10-CM

## 2023-12-21 LAB
CONTROL RUN WITHIN 24 HOURS?: YES
LEUKOCYTE ESTERASE URINE: NEGATIVE
NITRITE URINE: NEGATIVE
PH URINE: 5.5 (ref 5–8)
SPEC GRAVITY: >=1.03 (ref 1–1.03)
UROBILINOGEN URINE: 1

## 2023-12-21 PROCEDURE — 81002 URINALYSIS NONAUTO W/O SCOPE: CPT

## 2023-12-21 PROCEDURE — 51700 IRRIGATION OF BLADDER: CPT

## 2023-12-21 RX ORDER — LIDOCAINE HYDROCHLORIDE 20 MG/ML
10 JELLY TOPICAL ONCE
Status: COMPLETED | OUTPATIENT
Start: 2023-12-21 | End: 2023-12-21

## 2023-12-21 RX ORDER — SODIUM CHLORIDE 9 MG/ML
17 INJECTION INTRAVENOUS ONCE
Status: COMPLETED | OUTPATIENT
Start: 2023-12-21 | End: 2023-12-21

## 2023-12-21 RX ORDER — BUPIVACAINE HYDROCHLORIDE 5 MG/ML
30 INJECTION, SOLUTION EPIDURAL; INTRACAUDAL ONCE
Status: COMPLETED | OUTPATIENT
Start: 2023-12-21 | End: 2023-12-21

## 2023-12-21 RX ORDER — HEPARIN SODIUM 10000 [USP'U]/ML
40000 INJECTION, SOLUTION INTRAVENOUS; SUBCUTANEOUS ONCE
Status: COMPLETED | OUTPATIENT
Start: 2023-12-21 | End: 2023-12-21

## 2023-12-21 RX ADMIN — BUPIVACAINE HYDROCHLORIDE 30 ML: 5 INJECTION, SOLUTION EPIDURAL; INTRACAUDAL at 15:42:00

## 2023-12-21 RX ADMIN — HEPARIN SODIUM 40000 UNITS: 10000 INJECTION, SOLUTION INTRAVENOUS; SUBCUTANEOUS at 15:42:00

## 2023-12-21 RX ADMIN — LIDOCAINE HYDROCHLORIDE 10 ML: 20 JELLY TOPICAL at 15:42:00

## 2023-12-21 RX ADMIN — SODIUM CHLORIDE 17 ML: 9 INJECTION INTRAVENOUS at 15:42:00

## 2023-12-21 NOTE — PROCEDURES
S:  Patient here for instill #1 given hx of bladder pain. Patient reports feeling a flare today, feeling frequency and bladder pain  Denies gross hematuria  Denies  s/sx of UTI  Bowels BOWEL STATUS: regular   Also using AZO previously for bladder pain, which did not help    O:  VS   Vitals:    12/21/23 1540   Resp: 20   Weight: 155 lb   Height: 67\"     Gen: NAD  :   Ext. Gen: wnl  Urethra: wnl  Vaginal discharge-wnl  Perineum: intact, non tender  Instill given per clinic protocol. Patient catheterized for a residual of 10ml. A/P:  Dx   Encounter Diagnoses   Name Primary? Bladder pain Yes    Urge urinary incontinence      Chemstrip performed. Continue estradiol cream  Reviewed instill instructions  Patient tolerated procedure well. Will need f/u w/ Dr. Nidia Delarosa in  18 wks. Next instill scheduled for next wk.

## 2023-12-28 ENCOUNTER — OFFICE VISIT (OUTPATIENT)
Dept: UROLOGY | Facility: CLINIC | Age: 58
End: 2023-12-28
Attending: OBSTETRICS & GYNECOLOGY
Payer: COMMERCIAL

## 2023-12-28 VITALS — TEMPERATURE: 99 F | HEIGHT: 67 IN | BODY MASS INDEX: 24.33 KG/M2 | WEIGHT: 155 LBS

## 2023-12-28 DIAGNOSIS — R39.89 BLADDER PAIN: Primary | ICD-10-CM

## 2023-12-28 LAB
CONTROL RUN WITHIN 24 HOURS?: YES
LEUKOCYTE ESTERASE URINE: NEGATIVE
NITRITE URINE: NEGATIVE

## 2023-12-28 PROCEDURE — 51700 IRRIGATION OF BLADDER: CPT

## 2023-12-28 PROCEDURE — 81002 URINALYSIS NONAUTO W/O SCOPE: CPT

## 2023-12-28 RX ORDER — BUPIVACAINE HYDROCHLORIDE 5 MG/ML
30 INJECTION, SOLUTION EPIDURAL; INTRACAUDAL ONCE
Status: COMPLETED | OUTPATIENT
Start: 2023-12-28 | End: 2023-12-28

## 2023-12-28 RX ORDER — LIDOCAINE HYDROCHLORIDE 20 MG/ML
10 JELLY TOPICAL ONCE
Status: COMPLETED | OUTPATIENT
Start: 2023-12-28 | End: 2023-12-28

## 2023-12-28 RX ORDER — HEPARIN SODIUM 10000 [USP'U]/ML
40000 INJECTION, SOLUTION INTRAVENOUS; SUBCUTANEOUS ONCE
Status: COMPLETED | OUTPATIENT
Start: 2023-12-28 | End: 2023-12-28

## 2023-12-28 RX ORDER — SODIUM CHLORIDE 9 MG/ML
17 INJECTION INTRAVENOUS ONCE
Status: COMPLETED | OUTPATIENT
Start: 2023-12-28 | End: 2023-12-28

## 2023-12-28 RX ADMIN — LIDOCAINE HYDROCHLORIDE 10 ML: 20 JELLY TOPICAL at 15:41:00

## 2023-12-28 RX ADMIN — BUPIVACAINE HYDROCHLORIDE 30 ML: 5 INJECTION, SOLUTION EPIDURAL; INTRACAUDAL at 15:40:00

## 2023-12-28 RX ADMIN — SODIUM CHLORIDE 17 ML: 9 INJECTION INTRAVENOUS at 15:41:00

## 2023-12-28 RX ADMIN — HEPARIN SODIUM 40000 UNITS: 10000 INJECTION, SOLUTION INTRAVENOUS; SUBCUTANEOUS at 15:40:00

## 2024-01-02 ENCOUNTER — TELEPHONE (OUTPATIENT)
Dept: ORTHOPEDICS CLINIC | Facility: CLINIC | Age: 59
End: 2024-01-02

## 2024-01-02 NOTE — TELEPHONE ENCOUNTER
----- Message from Marin Siegel sent at 2023  2:55 PM CST -----  Regardin23 Work Status Letter  Hello,     May I please get a work status letter issued from patient's LOV and backdated to 23, to send off to patients  - workers comp carrier.    Once completed please route back to me versus notifying the patient. This is a mandatory request per their  - workers comp carrier and not the patient.    Thank you,   Marin

## 2024-01-03 ENCOUNTER — OFFICE VISIT (OUTPATIENT)
Dept: NEUROLOGY | Facility: CLINIC | Age: 59
End: 2024-01-03
Payer: COMMERCIAL

## 2024-01-03 VITALS
OXYGEN SATURATION: 96 % | BODY MASS INDEX: 24 KG/M2 | WEIGHT: 156 LBS | HEART RATE: 86 BPM | RESPIRATION RATE: 16 BRPM | SYSTOLIC BLOOD PRESSURE: 102 MMHG | DIASTOLIC BLOOD PRESSURE: 60 MMHG

## 2024-01-03 DIAGNOSIS — R41.3 MEMORY LOSS: Primary | ICD-10-CM

## 2024-01-03 DIAGNOSIS — R25.3 EYELID TWITCH: ICD-10-CM

## 2024-01-03 PROCEDURE — 3074F SYST BP LT 130 MM HG: CPT | Performed by: OTHER

## 2024-01-03 PROCEDURE — 99244 OFF/OP CNSLTJ NEW/EST MOD 40: CPT | Performed by: OTHER

## 2024-01-03 PROCEDURE — 3078F DIAST BP <80 MM HG: CPT | Performed by: OTHER

## 2024-01-03 NOTE — PROGRESS NOTES
Patient states decrease in memory, patient is having difficulty with word finding and increase in stuttering. Patient states this started about a year ago, patient advise she may of had a TIA, this is undiagnosed. Patient states increase in itching of the RUE. Patient states decrease in balance. Denies recent head trauma. Patient states twitching of the right eye, patient states this occurs to 15-20 times a day.

## 2024-01-03 NOTE — PATIENT INSTRUCTIONS
Refill policies:    Allow 2-3 business days for refills; controlled substances may take longer.  Contact your pharmacy at least 5 days prior to running out of medication and have them send an electronic request or submit request through the “request refill” option in your [employer] account.  Refills are not addressed on weekends; covering physicians do not authorize routine medications on weekends.  No narcotics or controlled substances are refilled after noon on Fridays or by on call physicians.  By law, narcotics must be electronically prescribed.  A 30 day supply with no refills is the maximum allowed.  If your prescription is due for a refill, you may be due for a follow up appointment.  To best provide you care, patients receiving routine medications need to be seen at least once a year.  Patients receiving narcotic/controlled substance medications need to be seen at least once every 3 months.  In the event that your preferred pharmacy does not have the requested medication in stock (e.g. Backordered), it is your responsibility to find another pharmacy that has the requested medication available.  We will gladly send a new prescription to that pharmacy at your request.    Scheduling Tests:    If your physician has ordered radiology tests such as MRI or CT scans, please contact Central Scheduling at [phone] right away to schedule the test.  Once scheduled, the [facility] Centralized Referral Team will work with your insurance carrier to obtain pre-certification or prior authorization.  Depending on your insurance carrier, approval may take 3-10 days.  It is highly recommended patients assure they have received an authorization before having a test performed.  If test is done without insurance authorization, patient may be responsible for the entire amount billed.      Precertification and Prior Authorizations:  If your physician has recommended that you have a procedure or additional testing performed the [facility]  Centralized Referral Team will contact your insurance carrier to obtain pre-certification or prior authorization.    You are strongly encouraged to contact your insurance carrier to verify that your procedure/test has been approved and is a COVERED benefit.  Although the [facility] Centralized Referral Team does its due diligence, the insurance carrier gives the disclaimer that \"Although the procedure is authorized, this does not guarantee payment.\"    Ultimately the patient is responsible for payment.   Thank you for your understanding in this matter.  Paperwork Completion:  If you require FMLA or disability paperwork for your recovery, please make sure to either drop it off or have it faxed to our office at [phone]. Be sure the form has your name and date of birth on it.  The form will be faxed to our Forms Department and they will complete it for you.  There is a 25$ fee for all forms that need to be filled out.  Please be aware there is a 10-14 day turnaround time.  You will need to sign a release of information (SLY) form if your paperwork does not come with one.  You may call the Forms Department with any questions at [phone].  Their fax number is [phone].

## 2024-01-03 NOTE — PROGRESS NOTES
[facility] CONSULTATION    Date of consult: 1/3/2024    Assessment:  Cognitive deficit  Family history of alcohol related dementia  Eye twitch    Plan:  Smoking cessation advised  Neuropsych test ordered  EEG  Check labs  Reviewed recent MRI brain, chronic changes only  See orders and medications filed with this encounter. The patient indicates understanding of these issues and agrees with the plan.  Discussed with patient/family regarding assessment, work up, care plan   RTC 4 months  Pt should go ER for any new or worsening symptoms and contact office f  Subjective:   CC/Reason for consult: memory loss     HPI: Alannah Troncoso is a 58 year old female with past medical history as listed below presents here for initial evaluation of memory loss for 1 year; Patient states decrease in memory, patient is having difficulty with word finding and increase in stuttering. Patient states this started about a year ago, patient advise she might have had a \"TIA\". Patient states increase in itching of the RUE. Patient states decrease in balance. Denies recent head trauma. Patient states twitching of the right eye, patient states this occurs to 15-20 times a day. she states she does have some degree of baseline learning/reading difficulty throughout her whole life.    History/Other:   REVIEW OF SYSTEMS:  A comprehensive 14-point system was reviewed. Pertinent positives and negatives are noted in HPI.       Current Outpatient Medications:     estradiol (ESTRACE) 0.1 MG/GM Vaginal Cream, Apply 1/2 gram vaginally 2 times per week. (Patient not taking: Reported on 12/28/2023), Disp: 42.5 g, Rfl: 3  Allergies:  Allergies   Allergen Reactions    Adhesive Tape      blisters    Dilaudid [Hydromorphone] OTHER (SEE COMMENTS)     Temporary paralysis of left side     Past Medical History:   Diagnosis Date    Anesthesia complication     difficult to wake up sometimes    Back pain     Back problem     cervical and thoracic     Bad breath     Blurred vision     Chest pain on exertion     Constipation     Decorative tattoo     Depression     comes and goes    Dizziness     Easy bruising     Endometriosis     Esophageal reflux     Fatigue     Feeling lonely     Frequent urination     Heartburn 2018    High cholesterol     denies    History of depression     Hx of motion sickness     IBS (irritable bowel syndrome)     Internal hemorrhoid     burst x2    Irregular bowel habits     Itch of skin     Lab test positive for detection of COVID-19 virus     5/25/22 positive- no hospitalization- had a slight cough and some fatigue       Leaking of urine     Night sweats     Pain in joints     Pain with bowel movements     Painful swallowing 2019    Painful urination     Partial tear of common extensor tendon of elbow     (right elbow)   Surgery scheduled w Dr Lepe, 7/6/22    Problems with swallowing 2019    resolved    Scoliosis     Sinusitis     Sleep disturbance     Sputum production     Stress     Tooth infection     Vertigo     Weight gain      Past Surgical History:   Procedure Laterality Date    ADENOIDECTOMY  1979    done with tonsillectomy    CHOLECYSTECTOMY  2019    COLONOSCOPY  2001    COLONOSCOPY  11/2016    fair prep- repeat 3 yrs    COLONOSCOPY N/A 11/02/2016    Procedure: COLONOSCOPY;  Surgeon: Cirilo Gutierrez MD;  Location:  ENDOSCOPY    ELBOW SURGERY  2022    HYSTERECTOMY      unsure if total or partial    ARIANE LOCALIZATION WIRE 1 SITE RIGHT (CPT=19281) Right 1982    Benign    ARIANE LOCALIZATION WIRE 1 SITE RIGHT (CPT=19281)  09/2020    OTHER Right 2020, 1982    breast lumpectomy    OTHER      2021  foot surgery on both feet / hammertoe    OTHER SURGICAL HISTORY      laprascopy x 15 (endometriosis) from 2130-1807    OTHER SURGICAL HISTORY  1979    pilonidal cyst removal    OTHER SURGICAL HISTORY  2017    urinary sling    TONSILLECTOMY  1979     Social History:  Social History     Tobacco Use    Smoking status: Every Day      Packs/day: 1.50     Years: 35.00     Additional pack years: 0.00     Total pack years: 52.50     Types: Cigarettes    Smokeless tobacco: Never   Substance Use Topics    Alcohol use: Yes     Alcohol/week: 0.0 standard drinks of alcohol     Comment: rarely; less than 1 drink/week     Family History   Problem Relation Age of Onset    Heart Disease Father     Heart Attack Father     Other (Kidney Cancer) Mother 52    Suicide History Paternal Grandfather          by suicide    Other (Lung Cancer) Paternal Grandfather         smoked cigars    Breast Cancer Maternal Aunt 65        estimated age    Alcohol and Other Disorders Associated Maternal Grandmother     Alcohol and Other Disorders Associated Maternal Grandfather     Breast Cancer Maternal Cousin Female     Breast Cancer Paternal Cousin Female     Breast Cancer Paternal Cousin Female     Stroke Neg       Objective:   Physical Examination:  /60   Pulse 86   Resp 16   Wt 156 lb (70.8 kg)   SpO2 96%   BMI 24.43 kg/m²   General: Awake and alert; in no acute distress  HEENT: Eye sclerae are anicteric; scalp is atraumatic  Neck: Supple  Cardiac: Regular rate and regular rhythm  Lungs: Clear   Abdomen:  non-tender  Extremities: No clubbing or cyanosis; moves extremities   Psychiatric: Normal mood and affect; answers questions appropriately  Dermatologic: No rashes; no edema  Neurological Examination:  Language: normal   Speech: no dysarthria  CN: II-XII intact  Motor strength: 5/5 all extremities  Tone: normal  DTRs: 2+ symmetric  Coordination: Normal FTN  Sensory: symmetric   Gait: nl    Data Reviewed on 1/3/2024  Notes Reviewed on 1/3/2024  Labs Reviewed  on 1/3/2024    Siva Gomez MD (Michael)   Neurology  [facility]  1/3/2024, 2:10 PM  Consultation Report: being sent/fax/route to requesting provider   CC: Rojas Bell DO

## 2024-01-04 ENCOUNTER — OFFICE VISIT (OUTPATIENT)
Dept: UROLOGY | Facility: CLINIC | Age: 59
End: 2024-01-04
Attending: OBSTETRICS & GYNECOLOGY
Payer: COMMERCIAL

## 2024-01-04 DIAGNOSIS — R39.89 BLADDER PAIN: Primary | ICD-10-CM

## 2024-01-04 LAB
CONTROL RUN WITHIN 24 HOURS?: YES
LEUKOCYTE ESTERASE URINE: NEGATIVE
NITRITE URINE: NEGATIVE

## 2024-01-04 PROCEDURE — 81002 URINALYSIS NONAUTO W/O SCOPE: CPT

## 2024-01-04 PROCEDURE — 51700 IRRIGATION OF BLADDER: CPT

## 2024-01-04 RX ORDER — HEPARIN SODIUM 10000 [USP'U]/ML
40000 INJECTION, SOLUTION INTRAVENOUS; SUBCUTANEOUS ONCE
Status: COMPLETED | OUTPATIENT
Start: 2024-01-04 | End: 2024-01-04

## 2024-01-04 RX ORDER — BUPIVACAINE HYDROCHLORIDE 5 MG/ML
30 INJECTION, SOLUTION EPIDURAL; INTRACAUDAL ONCE
Status: COMPLETED | OUTPATIENT
Start: 2024-01-04 | End: 2024-01-04

## 2024-01-04 RX ORDER — SODIUM CHLORIDE 9 MG/ML
17 INJECTION INTRAVENOUS ONCE
Status: COMPLETED | OUTPATIENT
Start: 2024-01-04 | End: 2024-01-04

## 2024-01-04 RX ORDER — LIDOCAINE HYDROCHLORIDE 20 MG/ML
10 JELLY TOPICAL ONCE
Status: COMPLETED | OUTPATIENT
Start: 2024-01-04 | End: 2024-01-04

## 2024-01-04 RX ADMIN — LIDOCAINE HYDROCHLORIDE 10 ML: 20 JELLY TOPICAL at 15:30:00

## 2024-01-04 RX ADMIN — HEPARIN SODIUM 40000 UNITS: 10000 INJECTION, SOLUTION INTRAVENOUS; SUBCUTANEOUS at 16:05:00

## 2024-01-04 RX ADMIN — SODIUM CHLORIDE 17 ML: 9 INJECTION INTRAVENOUS at 15:30:00

## 2024-01-04 RX ADMIN — BUPIVACAINE HYDROCHLORIDE 30 ML: 5 INJECTION, SOLUTION EPIDURAL; INTRACAUDAL at 15:30:00

## 2024-01-04 NOTE — PROCEDURES
Anticipated Discharge Disposition:   · SNF    Action:   · LSW met with pt at bedside to discuss discharge plans/barriers. Discussed with pt need to expand his referrals to SNF due to complications with [place] being a [place] and complications as [place] facilities may not be in-network with this plan. He voiced understand and was agreeable to send referral to all local SNFs to determine if any facilities are in-network.   · LSW also discussed with pt, if he plans to remain in [place] he will need to update his current insurance plan of new residence and will likely need to enroll in a [place] State Medicare plan or back into original Medicare. He voiced understanding. Pt requested LSW follow up with his ex-wife, Barbara [phone], to explain information as well. Due to pt's son, Cm, work schedule   · Outreach call to pt's ex-wife, Barbara, to provide update. Explained to spouse current barriers related to pt's insurance with SNF for rehab. Ex-wife also indicating pt needs long-term care. LSW discussed that pt does not have coverage for LTC and pt and family will need to look into options with [facility] for Aid and Attendance as well as Medicaid. Ex-wife requesting hospital initiate process. LSW explained that hospital can assist but pt and family will need to initiate by gathering supporting documents. Ex-wife requested LSW email her steps/documents needed to proceed forward. LSW sent secure email to address provided [email or web address] regarding steps needed for pt to apply for Medicaid as well as Aid and Attendance with the [facility]. Attached list of supporting documents that would be required and resource information to Veterans Service Officers that can assist family with Aid and Attendance application.     Barriers to Discharge:   · Pt's insurance is out of state-Medicare Advantage plan.   · Family looking for LTC after [id number] no  source for LTC    Plan:   · Care coordination will continue  S:  Patient here for instill #3 given hx of bladder pain.  Patient reports feeling bladder pain today  Past instills have been helpful intermittently. The first gave immediate relief, the second gave no relief at all per patient.  Denies gross hematuria  Denies  s/sx of UTI  Bowels: bowels are irregular per pt for past 30 years.    Using no medications to assist with bowel habits  Not using any medications for bladder pain    O:  VS There were no vitals filed for this visit.  Gen: NAD  :   Ext. Gen: wnl, no lesions  Urethra: no caruncle, no atrophy  Vaginal discharge-none  Perineum: intact, non tender  Instill given per clinic protocol.   Patient catheterized for a PVR of 40ml.      A/P:  Dx   Encounter Diagnosis   Name Primary?    Bladder pain Yes     Chemstrip performed.    Continue estradiol cream  Reviewed bowel regimen  Reviewed instill instructions  Patient tolerated procedure well.    Will need f/u w/ Dr. Radha Montano in  April 2024.  Next instill scheduled for next week.    to follow up and provide assistance with discharge plans/barriers as needed.

## 2024-01-07 ENCOUNTER — PATIENT MESSAGE (OUTPATIENT)
Dept: ORTHOPEDICS CLINIC | Facility: CLINIC | Age: 59
End: 2024-01-07

## 2024-01-08 NOTE — TELEPHONE ENCOUNTER
From: Alannah Troncoso  To: Fish Lepe  Sent: 1/7/2024 5:26 PM CST  Subject: Elbow     I would like to schedule surgery for my elbow

## 2024-01-11 ENCOUNTER — OFFICE VISIT (OUTPATIENT)
Dept: UROLOGY | Facility: CLINIC | Age: 59
End: 2024-01-11
Attending: OBSTETRICS & GYNECOLOGY
Payer: COMMERCIAL

## 2024-01-11 ENCOUNTER — LAB ENCOUNTER (OUTPATIENT)
Dept: LAB | Age: 59
End: 2024-01-11
Attending: OBSTETRICS & GYNECOLOGY
Payer: COMMERCIAL

## 2024-01-11 DIAGNOSIS — R41.3 MEMORY LOSS: ICD-10-CM

## 2024-01-11 DIAGNOSIS — R39.89 BLADDER PAIN: Primary | ICD-10-CM

## 2024-01-11 LAB
CONTROL RUN WITHIN 24 HOURS?: YES
FOLATE SERPL-MCNC: 18.8 NG/ML (ref 8.7–?)
LEUKOCYTE ESTERASE URINE: NEGATIVE
NITRITE URINE: NEGATIVE
VIT B12 SERPL-MCNC: 452 PG/ML (ref 193–986)

## 2024-01-11 PROCEDURE — 51700 IRRIGATION OF BLADDER: CPT

## 2024-01-11 PROCEDURE — 84425 ASSAY OF VITAMIN B-1: CPT

## 2024-01-11 PROCEDURE — 82607 VITAMIN B-12: CPT

## 2024-01-11 PROCEDURE — 36415 COLL VENOUS BLD VENIPUNCTURE: CPT

## 2024-01-11 PROCEDURE — 82746 ASSAY OF FOLIC ACID SERUM: CPT

## 2024-01-11 PROCEDURE — 81002 URINALYSIS NONAUTO W/O SCOPE: CPT

## 2024-01-11 RX ORDER — HEPARIN SODIUM 10000 [USP'U]/ML
40000 INJECTION, SOLUTION INTRAVENOUS; SUBCUTANEOUS ONCE
Status: COMPLETED | OUTPATIENT
Start: 2024-01-11 | End: 2024-01-11

## 2024-01-11 RX ORDER — BUPIVACAINE HYDROCHLORIDE 5 MG/ML
30 INJECTION, SOLUTION EPIDURAL; INTRACAUDAL ONCE
Status: COMPLETED | OUTPATIENT
Start: 2024-01-11 | End: 2024-01-11

## 2024-01-11 RX ORDER — SODIUM CHLORIDE 9 MG/ML
17 INJECTION INTRAVENOUS ONCE
Status: COMPLETED | OUTPATIENT
Start: 2024-01-11 | End: 2024-01-11

## 2024-01-11 RX ORDER — LIDOCAINE HYDROCHLORIDE 20 MG/ML
10 JELLY TOPICAL ONCE
Status: COMPLETED | OUTPATIENT
Start: 2024-01-11 | End: 2024-01-11

## 2024-01-11 RX ADMIN — BUPIVACAINE HYDROCHLORIDE 30 ML: 5 INJECTION, SOLUTION EPIDURAL; INTRACAUDAL at 15:15:00

## 2024-01-11 RX ADMIN — HEPARIN SODIUM 40000 UNITS: 10000 INJECTION, SOLUTION INTRAVENOUS; SUBCUTANEOUS at 15:15:00

## 2024-01-11 RX ADMIN — SODIUM CHLORIDE 17 ML: 9 INJECTION INTRAVENOUS at 15:15:00

## 2024-01-11 RX ADMIN — LIDOCAINE HYDROCHLORIDE 10 ML: 20 JELLY TOPICAL at 15:15:00

## 2024-01-11 NOTE — PROCEDURES
S:  Patient here for instill #4 given hx of bladder pain.  Patient reports feeling no pain today   Past instills have been helpful  Denies gross hematuria  Denies  s/sx of UTI  Bowels : irregular. Goes daily, some days has to strain, other days leans towards diarrhea.   Not using additional medications for bladder pain    O:  VS There were no vitals filed for this visit.  Gen: NAD  :   Ext. Gen: no atrophy, no lesions  Urethra: no atrophy  Vaginal discharge-none  Perineum: intact, non tender  Instill given per clinic protocol.   Patient catheterized for a residual of 30ml.      A/P:  Dx   Encounter Diagnosis   Name Primary?    Bladder pain Yes     Chemstrip performed.    Continue estradiol cream  Reviewed bowel regimen  Reviewed instill instructions  Patient tolerated procedure well.    Will need f/u w/ Dr. Radha Montano in  April 2024.  Next instill scheduled for next week.

## 2024-01-17 LAB — VITAMIN B1 WHOLE BLD: 143.8 NMOL/L

## 2024-01-18 ENCOUNTER — OFFICE VISIT (OUTPATIENT)
Dept: UROLOGY | Facility: CLINIC | Age: 59
End: 2024-01-18
Attending: OBSTETRICS & GYNECOLOGY
Payer: COMMERCIAL

## 2024-01-18 DIAGNOSIS — R30.1 PAINFUL BLADDER SPASM: ICD-10-CM

## 2024-01-18 DIAGNOSIS — R39.89 BLADDER PAIN: ICD-10-CM

## 2024-01-18 DIAGNOSIS — N30.10 CHRONIC INTERSTITIAL CYSTITIS: Primary | ICD-10-CM

## 2024-01-18 LAB
BLOOD URINE: NEGATIVE
CONTROL RUN WITHIN 24 HOURS?: YES
LEUKOCYTE ESTERASE URINE: NEGATIVE
NITRITE URINE: NEGATIVE

## 2024-01-18 PROCEDURE — 81002 URINALYSIS NONAUTO W/O SCOPE: CPT

## 2024-01-18 PROCEDURE — 51700 IRRIGATION OF BLADDER: CPT

## 2024-01-18 RX ORDER — BUPIVACAINE HYDROCHLORIDE 5 MG/ML
30 INJECTION, SOLUTION EPIDURAL; INTRACAUDAL ONCE
Status: COMPLETED | OUTPATIENT
Start: 2024-01-18 | End: 2024-01-18

## 2024-01-18 RX ORDER — LIDOCAINE HYDROCHLORIDE 20 MG/ML
10 JELLY TOPICAL ONCE
Status: COMPLETED | OUTPATIENT
Start: 2024-01-18 | End: 2024-01-18

## 2024-01-18 RX ORDER — HEPARIN SODIUM 10000 [USP'U]/ML
40000 INJECTION, SOLUTION INTRAVENOUS; SUBCUTANEOUS ONCE
Status: COMPLETED | OUTPATIENT
Start: 2024-01-18 | End: 2024-01-18

## 2024-01-18 RX ORDER — SODIUM CHLORIDE 9 MG/ML
17 INJECTION INTRAVENOUS ONCE
Status: COMPLETED | OUTPATIENT
Start: 2024-01-18 | End: 2024-01-18

## 2024-01-18 RX ADMIN — HEPARIN SODIUM 40000 UNITS: 10000 INJECTION, SOLUTION INTRAVENOUS; SUBCUTANEOUS at 15:21:00

## 2024-01-18 RX ADMIN — BUPIVACAINE HYDROCHLORIDE 30 ML: 5 INJECTION, SOLUTION EPIDURAL; INTRACAUDAL at 15:20:00

## 2024-01-18 RX ADMIN — SODIUM CHLORIDE 17 ML: 9 INJECTION INTRAVENOUS at 15:22:00

## 2024-01-18 RX ADMIN — LIDOCAINE HYDROCHLORIDE 10 ML: 20 JELLY TOPICAL at 15:21:00

## 2024-01-18 NOTE — PROCEDURES
S:  Patient here for instill #5 given hx of bladder pain.  Patient reports feeling improvement  Past instills have been increasingly helpful. One flare over the weekend, pelvic pressure has improved.  Denies gross hematuria  Denies  s/sx of UTI    O:  VS There were no vitals filed for this visit.  Gen: NAD  :   Ext. Gen: no atrophy, no lesions  Urethra: no caruncle, no atrophy  Vaginal discharge-no  Perineum: intact, non tender  Instill given per clinic protocol.   Patient catheterized for a residual of 100ml.      A/P:  Dx   Encounter Diagnoses   Name Primary?    Chronic interstitial cystitis Yes    Painful bladder spasm     Bladder pain      Chemstrip performed.    Continue estradiol cream  Reviewed bowel regimen  Reviewed instill instructions  Patient tolerated procedure well.    Will need f/u w/ Dr. Radha Montano in  April 2024.  Next instill scheduled for next week.

## 2024-01-25 ENCOUNTER — OFFICE VISIT (OUTPATIENT)
Dept: UROLOGY | Facility: CLINIC | Age: 59
End: 2024-01-25
Attending: OBSTETRICS & GYNECOLOGY
Payer: COMMERCIAL

## 2024-01-25 VITALS — TEMPERATURE: 98 F | HEIGHT: 67 IN | WEIGHT: 156 LBS | BODY MASS INDEX: 24.48 KG/M2

## 2024-01-25 DIAGNOSIS — R39.89 BLADDER PAIN: Primary | ICD-10-CM

## 2024-01-25 PROCEDURE — 81002 URINALYSIS NONAUTO W/O SCOPE: CPT

## 2024-01-25 PROCEDURE — 51700 IRRIGATION OF BLADDER: CPT

## 2024-01-25 RX ORDER — HEPARIN SODIUM 10000 [USP'U]/ML
40000 INJECTION, SOLUTION INTRAVENOUS; SUBCUTANEOUS ONCE
Status: COMPLETED | OUTPATIENT
Start: 2024-01-25 | End: 2024-01-25

## 2024-01-25 RX ORDER — BUPIVACAINE HYDROCHLORIDE 5 MG/ML
30 INJECTION, SOLUTION EPIDURAL; INTRACAUDAL ONCE
Status: COMPLETED | OUTPATIENT
Start: 2024-01-25 | End: 2024-01-25

## 2024-01-25 RX ORDER — LIDOCAINE HYDROCHLORIDE 20 MG/ML
10 JELLY TOPICAL ONCE
Status: COMPLETED | OUTPATIENT
Start: 2024-01-25 | End: 2024-01-25

## 2024-01-25 RX ORDER — SODIUM CHLORIDE 9 MG/ML
17 INJECTION, SOLUTION INTRAMUSCULAR; INTRAVENOUS; SUBCUTANEOUS ONCE
Status: COMPLETED | OUTPATIENT
Start: 2024-01-25 | End: 2024-01-25

## 2024-01-25 RX ADMIN — HEPARIN SODIUM 40000 UNITS: 10000 INJECTION, SOLUTION INTRAVENOUS; SUBCUTANEOUS at 15:38:00

## 2024-01-25 RX ADMIN — SODIUM CHLORIDE 17 ML: 9 INJECTION, SOLUTION INTRAMUSCULAR; INTRAVENOUS; SUBCUTANEOUS at 15:38:00

## 2024-01-25 RX ADMIN — LIDOCAINE HYDROCHLORIDE 10 ML: 20 JELLY TOPICAL at 15:38:00

## 2024-01-25 RX ADMIN — BUPIVACAINE HYDROCHLORIDE 30 ML: 5 INJECTION, SOLUTION EPIDURAL; INTRACAUDAL at 15:37:00

## 2024-01-25 NOTE — PROCEDURES
S:  Patient here for instill #6 given hx of bladder pain.  Patient reports feeling worse this week, had 3 flares this week  Has a flare currently so hoping today's instill will help some  Past instills have been somewhat helpful, but overall not helping  Denies gross hematuria  Denies  s/sx of UTI  Bowels BOWEL STATUS: regular   Also using Tylenol/Azo for bladder pain    O:  VS   Vitals:    01/25/24 1458   Temp: 98.1 °F (36.7 °C)   Weight: 156 lb   Height: 67\"     Gen: NAD  :   Ext. Gen: no lesions  Urethra: + atrophy  Vaginal discharge-none  Perineum: intact, non tender  Instill given per clinic protocol.   Patient catheterized for a residual of 10 ml.      A/P:  Dx   Encounter Diagnosis   Name Primary?    Bladder pain Yes     Chemstrip performed.    Continue estradiol cream  Reviewed bowel regimen  Reviewed instill instructions  Patient tolerated procedure well.    Will need f/u w/ Dr. Radha Montano in  4/2024.  Scheduled follow up w/Courtney COYLE sooner  Next instill scheduled for N/A.   Patient does not want to continue instills at this time

## 2024-02-01 ENCOUNTER — TELEPHONE (OUTPATIENT)
Dept: ORTHOPEDICS CLINIC | Facility: CLINIC | Age: 59
End: 2024-02-01

## 2024-02-06 ENCOUNTER — TELEPHONE (OUTPATIENT)
Dept: ORTHOPEDICS CLINIC | Facility: CLINIC | Age: 59
End: 2024-02-06

## 2024-02-06 DIAGNOSIS — T84.84XA PAINFUL ORTHOPAEDIC HARDWARE (HCC): Primary | ICD-10-CM

## 2024-02-06 NOTE — TELEPHONE ENCOUNTER
Date of Surgery: 3/4/24       Post Op Appt: 3/11/24 @ 1120    Case ID: [id number]    Notes:         SURGICAL BOOKING SHEET   Name: Alannah Troncoso  MRN: [id number]   : 1965     Surgical Date:    3/4/24   Surgical Consent:    Removal of right elbow painful suture   Diagnosis:         ICD-10-CM   1. Painful orthopaedic hardware (HCC)  T84.84XA       Procedure Codes:    Removal of Hardware Deep (CPT 20788)   Disposition:    Outpatient   Operative Time:    15 mins   Antibiotics:    Ancef 2g   Anesthesia Type:    General   Clearance:     NONE   Equipment:    0 Vicryl UR-6, 3-0 Monocryl, 4-0 Monocryl, Skin glue & Steri-Strips, 4 inch/2 inch ace wrap, Sling, Sterile Tourniquet, Mini-C arm (Standby)   Assistant:    Roberth Assistant: Yes, Samantha Waller PA-C   Follow Up:    7-10 days post op with Samantha Waller   Pain Medication:    TBD   Therapy:    None

## 2024-02-07 ENCOUNTER — VIRTUAL PHONE E/M (OUTPATIENT)
Dept: UROLOGY | Facility: CLINIC | Age: 59
End: 2024-02-07
Attending: OBSTETRICS & GYNECOLOGY
Payer: COMMERCIAL

## 2024-02-07 DIAGNOSIS — R39.15 URINARY URGENCY: ICD-10-CM

## 2024-02-07 DIAGNOSIS — R39.89 BLADDER PAIN: Primary | ICD-10-CM

## 2024-02-07 DIAGNOSIS — M62.89 PELVIC FLOOR TENSION: ICD-10-CM

## 2024-02-07 DIAGNOSIS — R35.0 URINARY FREQUENCY: ICD-10-CM

## 2024-02-07 RX ORDER — DIAZEPAM 10 MG/1
10 TABLET ORAL NIGHTLY
Qty: 30 TABLET | Refills: 0 | Status: SHIPPED | OUTPATIENT
Start: 2024-02-07

## 2024-02-07 NOTE — PROGRESS NOTES
Given circumstances surrounding COVID-19 this visit is being conducted as a televisit with pt's consent.  Pt in safe, private environment for televisit, provider located in the office setting.    Visit for f/u of bladder pain  Has completed weekly instills x6  Reports some temporary improvement if instill done on day of flare but otherwise not much improvement in frequency of flares    Reports flare sx of vaginal pressure, increased urinary urgency, frequency  Occurs 2-3x weekly  No particular triggeres  Denies dysuria    She is currently using vag estrogen 2x weekly    Bowels very loose this week (thinks she has a GI bug)  Typically very reg    Wants to try TENS unit     Previously tried:  PFPT (no improvement, not interested in retrying)     Previously under the care of Dr. 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  Results reviewed with patient  567 ml void (85 ml PVR)  400 ml capacity  + Unstable detrusor at 400 mL, resulting in urgency but no leaking.   No demonstrable  ESTEVAN with cough or Valsalva at all volumes tested.   Pressure/void study: Voided 352 ml in the bathroom with  mL      Impression/Plan:    ICD-10-CM    1. Bladder pain  R39.89 diazePAM 10 MG Oral Tab      2. Pelvic floor tension  M62.89 diazePAM 10 MG Oral Tab      3. Urinary urgency  R39.15       4. Urinary frequency  R35.0           Discussion Items:   Behavioral and pharmacologic treatments for IC/PBS     Discussed med options including vaginal valium, amitriptyline, and hydroxyzine    Treatment Plan:  Start vaginal valium 10 mg   Ok to try OTC TENS unit  Bowel  regimen  Bladder diet/drill  Pelvic floor exercises  Call with s/sx of UTI    All questions answered  She understands and agrees to plan    Return in about 6 weeks (around 3/20/2024) for bladder pain (phone visit).    Courtney Manuel PA-C

## 2024-02-08 NOTE — TELEPHONE ENCOUNTER
Called and spoke with Alannah in regards to her upcoming surgery. I did confirm that surgery will take place at [facility]. I also discussed the surgical protocol and post op appt date and time with her. She states understanding with no concerns or questions.

## 2024-02-21 ENCOUNTER — NURSE ONLY (OUTPATIENT)
Dept: ELECTROPHYSIOLOGY | Facility: HOSPITAL | Age: 59
End: 2024-02-21
Attending: Other
Payer: COMMERCIAL

## 2024-02-21 DIAGNOSIS — R41.3 MEMORY LOSS: ICD-10-CM

## 2024-02-21 PROCEDURE — 95816 EEG AWAKE AND DROWSY: CPT | Performed by: OTHER

## 2024-02-21 NOTE — PROCEDURES
Date of Procedure: 2/21/2024    Procedure: EEG (ELECTROENCEPHALOGRAM)     HX:A 57YO FEMALE WHO PRESENTS FOR MEMORY LOSS FOR ONE YEAR. PATIENT HAVING DIFFICULTY WITH WORD FINDING AND INCREASE IN STUTTERING. LAST YEAR PATIENT WAS ADVISED THAT SHE MAY OF HAD A \"TIA\". SHE STATES HAVING RIGHT EYE TWITCHING THAT OCCURS 15-20TIMES A DAY AND INCREASE IN ITCHING OF RUE. DENIES ANY RECENT HEAD TRAUMA AND NO HISTORY OF SEIZURES HAVE BEEN REPORTED.   PMH: HEADACHE, DYSARTHRIA  RX:DIAZEPAM  PT STATE:ALERT OX4  DURING EEG:AWAKE,DROWSY,SLEEP    BACKGROUND ACTIVITY: Posterior rhythm was in the range of 8-10 Hz, reactive to eye opening; symmetrical and synchronous. Noted also are  intermittent slowing of background activity. Drowsiness is characterized by intermittent theta waves bitemporally.   EPILEPTIFORM DISCHARGES: There were no epileptiform discharges or periodic lateralized epileptiform discharges (PLEDs) recorded throughout the recording.   HYPERVENTILATION: Hyperventilation was performed with no change.  PHOTIC STIMULATION: Photic stimulation was performed with no change.   Stage II sleep was not reached.    IMPRESSION:  This is a normal awake and drowsy EEG. However, this does not rule out seizure disorder. Clinical correlation is advised.    Siva Gomez MD (Michael)   Neurology  [facility]  2/21/2024, 3:12 PM  CC: Rojas Bell DO

## 2024-02-23 ENCOUNTER — TELEPHONE (OUTPATIENT)
Dept: PHYSICAL THERAPY | Facility: HOSPITAL | Age: 59
End: 2024-02-23

## 2024-02-27 ENCOUNTER — OFFICE VISIT (OUTPATIENT)
Dept: SPEECH THERAPY | Facility: HOSPITAL | Age: 59
End: 2024-02-27
Attending: Other
Payer: COMMERCIAL

## 2024-02-27 DIAGNOSIS — R41.3 MEMORY LOSS: Primary | ICD-10-CM

## 2024-02-27 DIAGNOSIS — R41.841 COGNITIVE COMMUNICATION DEFICIT: ICD-10-CM

## 2024-02-27 PROCEDURE — 92523 SPEECH SOUND LANG COMPREHEN: CPT

## 2024-02-27 NOTE — PROGRESS NOTES
ADULT SPEECH/LANGUAGE EVALUATION:     Diagnosis:   Memory loss [R41.3], Cognitive communication deficit [R41.841]    Referring Provider: Jason  Date of Evaluation:    2/27/2024    Precautions:   Cognition Next MD visit:   none scheduled  Date of Surgery: n/a     Cognitive-communication evaluation completed per MD order. Patient arrived on time. Patient participated actively in assessment tasks.     PATIENT SUMMARY   Alannah Troncoso is a 58 year old female who presents to therapy today with complaints of memory deficits. Patient reports that deficits were apparent about 7 months ago in which she started to experience difficulty recalling information and finding words. Patient states that she is able to remember information from years ago but has increased difficulty with more recent information. Patient reports possible history of TIA which was unable to be confirmed with imaging, however she reports slurred speech and disfluencies for about a 2 week period 1 year ago.   Incident/Injury: N/A  Pain: No pain reported on this date. Patient not being seen for pain.  Hospital/Rehab course: Hospitalization for possible TIA 1 year ago.   Current functional limitations include difficulty recalling pertinent and meaningful information.   Alannah describes prior level of function WFL prior to 1 year ago. Pt goals include improving cognitive-communication.  Past medical history was reviewed with Alannah. Significant findings include   Past Medical History:   Diagnosis Date    Anesthesia complication     difficult to wake up sometimes    Back pain     Back problem     cervical and thoracic    Bad breath     Blurred vision     Chest pain on exertion     Constipation     Decorative tattoo     Depression     comes and goes    Dizziness     Easy bruising     Endometriosis     Esophageal reflux     Fatigue     Feeling lonely     Frequent urination     Heartburn 2018    High cholesterol     denies    History of depression     Hx of  motion sickness     IBS (irritable bowel syndrome)     Internal hemorrhoid     burst x2    Irregular bowel habits     Itch of skin     Lab test positive for detection of COVID-19 virus     5/25/22 positive- no hospitalization- had a slight cough and some fatigue       Leaking of urine     Night sweats     Pain in joints     Pain with bowel movements     Painful swallowing 2019    Painful urination     Partial tear of common extensor tendon of elbow     (right elbow)   Surgery scheduled w Dr Lepe, 7/6/22    Problems with swallowing 2019    resolved    Scoliosis     Sinusitis     Sleep disturbance     Sputum production     Stress     Tooth infection     Vertigo     Weight gain      Medications:   Current Outpatient Medications on File Prior to Visit   Medication Sig Dispense Refill    diazePAM 10 MG Oral Tab Place 1 tablet (10 mg total) vaginally nightly. 30 tablet 0    estradiol (ESTRACE) 0.1 MG/GM Vaginal Cream Apply 1/2 gram vaginally 2 times per week. 42.5 g 3     Current Facility-Administered Medications on File Prior to Visit   Medication Dose Route Frequency Provider Last Rate Last Admin    [COMPLETED] bupivacaine PF (Marcaine) 0.5% injection  30 mL Other Once Radha Montano MD   30 mL at 01/25/24 1537    [COMPLETED] heparin (Porcine) 12889 Units/mL injection 40,000 Units  40,000 Units Intravesical Once Radha Montano MD   40,000 Units at 01/25/24 1538    [COMPLETED] lidocaine (Urojet) 2 % urethral jelly 10 mL  10 mL Intravesical Once Radha Montano MD   10 mL at 01/25/24 1538    [COMPLETED] sodium chloride 0.9% PF injection 17 mL  17 mL Intravesical Once Radha Montano MD   17 mL at 01/25/24 1538    [COMPLETED] bupivacaine PF (Marcaine) 0.5% injection  30 mL Other Once Radha Montano MD   30 mL at 01/18/24 1520    [COMPLETED] heparin (Porcine) 47425 Units/mL injection 40,000 Units  40,000 Units Intravesical Once Radha Montano MD   40,000 Units at 01/18/24 1521    [COMPLETED] lidocaine  (Urojet) 2 % urethral jelly 10 mL  10 mL Intravesical Once Radha Montano MD   10 mL at 01/18/24 1521    [COMPLETED] sodium chloride 0.9% PF injection 17 mL  17 mL Intravesical Once Radha Montano MD   17 mL at 01/18/24 1522    [COMPLETED] bupivacaine PF (Marcaine) 0.5% injection  30 mL Other Once Radha Montano MD   30 mL at 01/11/24 1515    [COMPLETED] heparin (Porcine) 79361 Units/mL injection 40,000 Units  40,000 Units Intravesical Once Radha Montano MD   40,000 Units at 01/11/24 1515    [COMPLETED] lidocaine (Urojet) 2 % urethral jelly 10 mL  10 mL Intravesical Once Radha Montano MD   10 mL at 01/11/24 1515    [COMPLETED] sodium chloride 0.9% PF injection 17 mL  17 mL Intravesical Once Radha Montano MD   17 mL at 01/11/24 1515    [COMPLETED] bupivacaine PF (Marcaine) 0.5% injection  30 mL Other Once Radha Montano MD   30 mL at 01/04/24 1530    [COMPLETED] heparin (Porcine) 19701 Units/mL injection 40,000 Units  40,000 Units Intravesical Once Radha Montano MD   40,000 Units at 01/04/24 1605    [COMPLETED] lidocaine (Urojet) 2 % urethral jelly 10 mL  10 mL Intravesical Once Radha Montano MD   10 mL at 01/04/24 1530    [COMPLETED] sodium chloride 0.9% PF injection 17 mL  17 mL Intravesical Once Radha Montano MD   17 mL at 01/04/24 1530    [COMPLETED] bupivacaine PF (Marcaine) 0.5% injection  30 mL Other Once Radha Montano MD   30 mL at 12/28/23 1540    [COMPLETED] heparin (Porcine) 13016 Units/mL injection 40,000 Units  40,000 Units Intravesical Once Radha Montano MD   40,000 Units at 12/28/23 1540    [COMPLETED] lidocaine (Urojet) 2 % urethral jelly 10 mL  10 mL Intravesical Once Radha Montano MD   10 mL at 12/28/23 1541    [COMPLETED] sodium chloride 0.9% PF injection 17 mL  17 mL Intravesical Once Radha Montano MD   17 mL at 12/28/23 1541    [COMPLETED] bupivacaine PF (Marcaine) 0.5% injection  30 mL Other Once Radha Montano MD   30 mL at 12/21/23 1542     [COMPLETED] heparin (Porcine) 53863 Units/mL injection 40,000 Units  40,000 Units Intravesical Once Radha Montano MD   40,000 Units at 23 1542    [COMPLETED] lidocaine (Urojet) 2 % urethral jelly 10 mL  10 mL Intravesical Once Radha Montano MD   10 mL at 23 1542    [COMPLETED] sodium chloride 0.9% PF injection 17 mL  17 mL Intravesical Once Radha Montano MD   17 mL at 23 1542     ASSESSMENT  Alannah presents to speech therapy evaluation with primary c/o memory deficits. The results of the objective tests and measures show difficulty recalling novel paragraph length information, recalling personal facts, and lexical retrieval. Functional deficits include but are not limited to difficulty recalling pertinent and meaningful daily life information for communication and safety purposes. Signs and symptoms are consistent with diagnosis of cognitive-communication deficit. Pt and SLP discussed evaluation findings, pathology, POC and HEP.  Pt voiced understanding and performs HEP correctly. Skilled Speech Therapy is medically necessary to address the above impairments and reach functional goals.     OBJECTIVE:   Assessment tools used: Cognitive-Linguistic Quick Test (CLQT), Cognitive Function Short Form    COGNITIVE-COMMUNICATIVE SKILLS  Severity: Mild  Deficits: Memory - recall and working memory deficits are noted per objective measures, Lexical retrieval    Cognitive Linguistic Quick Test (CLQT): Administered the CLQT to assess cognitive functions in the following domains: attention, memory, executive functions, language, visuospatial skills, and clock drawing.     CLQT scores are as follows: Attention: 195 (LAQ=773-333); Memory: 129 (YEN=786-277); Executive Functions 34 (WNL=24-40); Language: 27 (WNL=29-37); Visuospatial Skills: 96 (WNL=); Clock Drawin (WNL=12-13).  Composite Cognitive Score 3.4 (WNL 3.5-4.0).    Subtests:  Personal Facts= 7 (average=8)  Symbol Cancellations= 12  (average=11)  Confrontation Naming= 10 (average=10)  Clock Drawing= 13 (average=12)  Story Retelling= 4 (average=6)  Symbol Trails= 10 (average=9)  Generative Naming= 6 (average=5)  Design Memory= 5 (average=5)  Mazes= 7 (average=7)  Design Generation= 11 (average=6)    Cognitive Function Short Form 8a: This patient reported outcome measure is utilized to determine patients' perceptions of cognitive function and impact on daily life activities.   Score: 12/40 (patient scored \"very often/several times a day\" for 5/8 questions, \"often/once a day\" for 2/8 questions, \"sometimes/2-3 times a week\" for 1/8 questions)  A lower score indicates more severe perception of cognitive function.       Today's Treatment:  Pt education was provided on exam findings, treatment diagnosis, treatment plan, expectations, and prognosis.   Patient was instructed in and issued a HEP for: taking note of how deficits impact daily life tasks    Charges: Yang x1, 87615      Total Timed Treatment: 45 min     Total Treatment Time: 45 min     PLAN OF CARE:    Goals: (to be met in 12 visits)   STG 1: Patient will verbalize  compensatory memory strategies (processing, working memory, short-term memory, attention, problem-solving) and describe 1 use per strategy given min verbal and visual cues.  STG 2: Patient will utilize an external memory aid to recall daily activities and/or prospective tasks within 90% of opportunities given min verbal and visual cueing.  STG 3: Patient will recall novel paragraph length stimuli with 75% accuracy following a 10 minute delay, with mod verbal and visual cues for use of compensatory memory strategies.  STG 4: Patient will accurately complete working memory tasks within 80% of opportunities given mod verbal and visual cueing.    Frequency / Duration: Patient will be seen for 1-2 x/week or a total of 12 visits over a 90 day period. Treatment will include: Speech Therapy    Education or treatment limitation:  None  Rehab Potential:good given compliance with HEP and consistent attendance      Patient/Family/Caregiver was advised of these findings, precautions, and treatment options and has agreed to actively participate in planning and for this course of care.    Thank you for your referral. Please co-sign or sign and return this letter via fax as soon as possible to [phone]. If you have any questions, please contact me at Dept: [phone]    Sincerely,  Electronically signed by therapist: DIONNE Michael  Physician's certification required: Yes  I certify the need for these services furnished under this plan of treatment and while under my care.    X___________________________________________________ Date____________________    Certification From: 2/27/2024  To:5/27/2024

## 2024-03-04 ENCOUNTER — TELEPHONE (OUTPATIENT)
Dept: ORTHOPEDICS CLINIC | Facility: CLINIC | Age: 59
End: 2024-03-04

## 2024-03-05 ENCOUNTER — APPOINTMENT (OUTPATIENT)
Dept: SPEECH THERAPY | Facility: HOSPITAL | Age: 59
End: 2024-03-05
Attending: Other
Payer: COMMERCIAL

## 2024-03-06 ENCOUNTER — PATIENT MESSAGE (OUTPATIENT)
Dept: ORTHOPEDICS CLINIC | Facility: CLINIC | Age: 59
End: 2024-03-06

## 2024-03-06 NOTE — TELEPHONE ENCOUNTER
From: Alannah Troncoso  To: Fish Lepe  Sent: 3/6/2024 7:01 AM CST  Subject: Elbow     Good morning.  I opted out of going to the ER yesterday. I started doing PT exercises from when I went before. It's helping. I still can't reach my face or straighten out my arm, but it's getting better.  Sadly I feel I can't go back to work tomorrow. Could I please have a note excusing me until after my follow up appointment with you on Monday?  It will have to be sent to [employer], my workman's comp.   Thank you.  -Alannah

## 2024-03-06 NOTE — TELEPHONE ENCOUNTER
DOS 3/4/24    Pt states pain varies between 3 and 10/10.  Requesting note be sent to [employer], the wok comp  that excuses her from work through Mondays follow up appt. Pended. Will fax to [employer] on signing.    \"I still can't reach my face or straighten out my arm, but it's getting better.  Sadly I feel I can't go back to work tomorrow. Could I please have a note excusing me until after my follow up appointment with you on Monday?\"    Future Appointments   Date Time Provider Department Center   3/7/2024  3:30 PM Lani Mahoney [facility]   3/11/2024 11:20 AM Samantha Sánchez PA [id number]   3/12/2024  3:30 PM Lani Mahoney SLP [facility]   3/14/2024  3:30 PM Lani Mahoney [facility]   3/19/2024  3:30 PM Lani Mahoney [facility]   3/20/2024  3:00 PM Courtney Manuel PA [facility] None   4/3/2024 11:00 AM Siva Gomez MD [facility]

## 2024-03-07 ENCOUNTER — OFFICE VISIT (OUTPATIENT)
Dept: SPEECH THERAPY | Facility: HOSPITAL | Age: 59
End: 2024-03-07
Attending: Other
Payer: COMMERCIAL

## 2024-03-07 PROCEDURE — 92507 TX SP LANG VOICE COMM INDIV: CPT

## 2024-03-07 NOTE — PROGRESS NOTES
Diagnosis:   Memory loss [R41.3], Cognitive communication deficit [R41.841]        Referring Provider: Jason  Date of Evaluation:   2/27/2024    Precautions:   Cognition Next MD visit:   none scheduled  Date of Surgery: n/a   Insurance Primary/Secondary: [employer] [place] P[place] / N/A       # Auth Visits: 19   Total Timed Treatment: 40 min  Date POC Expires: 5/27/2024  Total Treatment time: 40 min  Charges: [id number]   Treatment Number: 2    Subjective: Patient arrived on time to session. Patient participated actively in therapeutic tasks.      Pain: No pain reported on this date. Patient not being seen for pain.     Objective:    Goals: (to be met in 12 visits)   STG 1: Patient will verbalize  compensatory memory strategies (processing, working memory, short-term memory, attention, problem-solving) and describe 1 use per strategy given min verbal and visual cues.  Progress: Initial training occurred on this date. Patient benefited max cues to support learning.    STG 2: Patient will utilize an external memory aid to recall daily activities and/or prospective tasks within 90% of opportunities given min verbal and visual cueing.  Progress: Goal not targeted on this date d/t focus on other goal areas.     STG 3: Patient will recall novel paragraph length stimuli with 75% accuracy following a 10 minute delay, with mod verbal and visual cues for use of compensatory memory strategies.  Progress: 75% immediate recall given mod verbal and visual cues    STG 4: Patient will accurately complete working memory tasks within 80% of opportunities given mod verbal and visual cueing.  Progress: 70% given mod verbal and visual cues    HEP: compensatory memory strategies  Education: importance of attention for memory    Assessment: Patient presents with cognitive-communication deficits c/b difficulty recalling novel paragraph length information, recalling personal facts, and lexical retrieval. Patient's deficits impact patient's ability to recall  pertinent information and engage in meaningful interactions. On this date, patient completed initial training of compensatory memory strategies and internal cognitive supports. During this session, attention deficits impacted patient's ability to recall pertinent and meaningful information from novel stimuli verbally presented. Patient benefited from verbal and visual cueing and opportunities to practice compensatory strategies. Continued therapy is necessary to support patient's progress toward goals.       Plan: Continue speech-language therapy targeting cognitive-communication.

## 2024-03-11 ENCOUNTER — TELEPHONE (OUTPATIENT)
Dept: ORTHOPEDICS CLINIC | Facility: CLINIC | Age: 59
End: 2024-03-11

## 2024-03-11 ENCOUNTER — OFFICE VISIT (OUTPATIENT)
Dept: ORTHOPEDICS CLINIC | Facility: CLINIC | Age: 59
End: 2024-03-11
Payer: OTHER MISCELLANEOUS

## 2024-03-11 VITALS — WEIGHT: 156 LBS | BODY MASS INDEX: 24.48 KG/M2 | HEIGHT: 67 IN

## 2024-03-11 DIAGNOSIS — M77.11 LATERAL EPICONDYLITIS OF RIGHT ELBOW: Primary | ICD-10-CM

## 2024-03-11 PROCEDURE — 99024 POSTOP FOLLOW-UP VISIT: CPT | Performed by: PHYSICIAN ASSISTANT

## 2024-03-11 NOTE — PROGRESS NOTES
Clinic Note [facility]     Assessment/Plan:  58 year old female    Right elbow common extensor tendon debridement and repair 7/6/2022 with subsequent deep suture removal on 3/4/2024-patient will continue with home exercise program for range of motion and strengthening.  She will return to work on Friday full duty.  I do think her residual symptoms will continue to improve but we will have her follow-up to monitor her pain.  Patient does use a nicotine product specifically cigarettes which can create increased inflammatory response and slow down recovery and healing.  She may benefit from smoking cessation to facilitate healing.  Work status: Patient can work without restrictions    Follow Up: 6 weeks       Diagnostic Studies:  X-ray right elbow 3 views: No fractures dislocations or osseous abnormalities  MRI right elbow: A partial tear of the common extensor tendon with significant tendonitic changes  MRI right elbow: Healed common extensor tendon repair.  No evidence of retear noted.    Physical Exam:    Ht 5' 7\" (1.702 m)   Wt 156 lb (70.8 kg)   BMI 24.43 kg/m²     Constitutional: NAD. AOx3. Well-developed and Well-nourished.   Psychiatric: Normal mood/ affect/ behavior. Judgment and thought content normal.     Right upper Extremity:   Inspection: Skin Intact. No skin lesions. No gross deformity.   Palpation:  Continues to have tenderness around the incision site and lateral epicondyle   Motion: Elbow: Patient is able to actively extend the elbow to 5 degrees and flex to 160 degrees.  Wrist: normal bilateral symmetric ext/flex/sup/pro  Finger: full composite fist   Special Tests:  No reproduction of the radial nerve symptoms with resisted pronation or supination.  No pain with resisted middle finger extension and wrist extension.  Sensation is otherwise intact in the median and ulnar and radial sensory nerve distribution when not provoked.  Motor functions intact AIN, PIN, ulnar motor nerve.           CC:  Right elbow pain    HPI: This 58 year old right-hand-dominant female presents with right elbow pain after an injury at work on May 12.  A heavy steel glass door hit the lateral aspect of her elbow.  She was initially evaluated with x-rays negative however her wrist pain has failed to improve in any significant manner.  She rates the pain is moderate to severe.  She describes the pain as sharp, constant, aching, shooting, and locking in nature.  She does wear wrist brace that does help her.  She tried a counterforce brace that that was uncomfortable and discontinued its use.    Interval history: Patient had deep suture removal and her postop pain is slowly improving.    Past Medical History:   Diagnosis Date    Anesthesia complication     difficult to wake up sometimes    Back pain     Back problem     cervical and thoracic    Bad breath     Blurred vision     Chest pain on exertion     Constipation     Decorative tattoo     Depression     comes and goes    Dizziness     Easy bruising     Endometriosis     Esophageal reflux     Fatigue     Feeling lonely     Frequent urination     Heartburn 2018    High cholesterol     denies    History of depression     Hx of motion sickness     IBS (irritable bowel syndrome)     Internal hemorrhoid     burst x2    Irregular bowel habits     Itch of skin     Lab test positive for detection of COVID-19 virus     5/25/22 positive- no hospitalization- had a slight cough and some fatigue       Leaking of urine     Night sweats     Pain in joints     Pain with bowel movements     Painful swallowing 2019    Painful urination     Partial tear of common extensor tendon of elbow     (right elbow)   Surgery scheduled w Dr Lepe, 7/6/22    Problems with swallowing 2019    resolved    Scoliosis     Sinusitis     Sleep disturbance     Sputum production     Stress     Tooth infection     Vertigo     Weight gain      Past Surgical History:   Procedure Laterality Date    ADENOIDECTOMY  1979    done  with tonsillectomy    CHOLECYSTECTOMY      COLONOSCOPY      COLONOSCOPY  2016    fair prep- repeat 3 yrs    COLONOSCOPY N/A 2016    Procedure: COLONOSCOPY;  Surgeon: Cirilo Gutierrez MD;  Location:  ENDOSCOPY    ELBOW SURGERY      HYSTERECTOMY      unsure if total or partial    ARIANE LOCALIZATION WIRE 1 SITE RIGHT (CPT=19281) Right     Benign    ARIANE LOCALIZATION WIRE 1 SITE RIGHT (CPT=19281)  2020    OTHER Right 1982    breast lumpectomy    OTHER        foot surgery on both feet / hammertoe    OTHER SURGICAL HISTORY      laprascopy x 15 (endometriosis) from 3250-4102    OTHER SURGICAL HISTORY      pilonidal cyst removal    OTHER SURGICAL HISTORY      urinary sling    TONSILLECTOMY       Current Outpatient Medications   Medication Sig Dispense Refill    estradiol (ESTRACE) 0.1 MG/GM Vaginal Cream Apply 1/2 gram vaginally 2 times per week. 42.5 g 3    HYDROcodone-acetaminophen (NORCO) 5-325 MG Oral Tab Take 1 tablet by mouth every 6 (six) hours as needed for Pain. (Patient not taking: Reported on 3/11/2024) 10 tablet 0    diazePAM 10 MG Oral Tab Place 1 tablet (10 mg total) vaginally nightly. (Patient not taking: Reported on 3/4/2024) 30 tablet 0     Allergies   Allergen Reactions    Adhesive Tape      blisters    Dilaudid [Hydromorphone] OTHER (SEE COMMENTS)     Temporary paralysis of left side     Family History   Problem Relation Age of Onset    Heart Disease Father     Heart Attack Father     Other (Kidney Cancer) Mother 52    Suicide History Paternal Grandfather          by suicide    Other (Lung Cancer) Paternal Grandfather         smoked cigars    Breast Cancer Maternal Aunt 65        estimated age    Alcohol and Other Disorders Associated Maternal Grandmother     Alcohol and Other Disorders Associated Maternal Grandfather     Breast Cancer Maternal Cousin Female     Breast Cancer Paternal Cousin Female     Breast Cancer Paternal Cousin Female     Stroke  Neg      Social History     Occupational History    Not on file   Tobacco Use    Smoking status: Every Day     Packs/day: 1.50     Years: 35.00     Additional pack years: 0.00     Total pack years: 52.50     Types: Cigarettes    Smokeless tobacco: Never   Vaping Use    Vaping Use: Never used   Substance and Sexual Activity    Alcohol use: Yes     Alcohol/week: 0.0 standard drinks of alcohol     Comment: rarely; less than 1 drink/week    Drug use: Yes     Types: Cannabis     Comment: once in a while    Sexual activity: Not on file        Review of Systems (negative unless bolded):  General: fevers, chills, fatigue  CV:  chest pain, palpitations, leg swelling  Msk: bodyaches, neck pain, neck stiffness  Skin: rashes, open wounds, nonhealing ulcers  Hem: bleeds easily, bruise easily, immunocompromised  Neuro: dizziness, light headedness, headaches  Psych: anxious, depressed, anger issues  Samantha Sánchez PA-C  Hand, Wrist, & Elbow Surgery  Physician Assistant to Dr. Fish Lepe  [facility]  [address] [place] [address], [address], [email or web address]  [email or web address]  t: [phone]  f: [phone]

## 2024-03-12 ENCOUNTER — OFFICE VISIT (OUTPATIENT)
Dept: SPEECH THERAPY | Facility: HOSPITAL | Age: 59
End: 2024-03-12
Attending: Other
Payer: COMMERCIAL

## 2024-03-12 PROCEDURE — 92507 TX SP LANG VOICE COMM INDIV: CPT

## 2024-03-12 NOTE — PROGRESS NOTES
Diagnosis:   Memory loss [R41.3], Cognitive communication deficit [R41.841]        Referring Provider: Jason  Date of Evaluation:   2/27/2024    Precautions:   Cognition Next MD visit:   none scheduled  Date of Surgery: n/a   Insurance Primary/Secondary: [employer] [place] P[place] / N/A       # Auth Visits: 19   Total Timed Treatment: 40 min  Date POC Expires: 5/27/2024  Total Treatment time: 40 min  Charges: [id number]   Treatment Number: 3    Subjective: Patient arrived on time to session. Patient participated actively in therapeutic tasks. Patient reported need to target daily life recall (i.e., remembering what she did that day). Patient provided with additional HEP and encouraged to utilize memory journal.     Pain: No pain reported on this date. Patient not being seen for pain.     Objective:    Goals: (to be met in 12 visits)   STG 1: Patient will verbalize compensatory memory strategies (processing, working memory, short-term memory, attention, problem-solving) and describe 1 use per strategy given min verbal and visual cues.  Progress: Mod cueing required     STG 2: Patient will utilize an external memory aid to recall daily activities and/or prospective tasks within 90% of opportunities given min verbal and visual cueing.  Progress: Provided with memory journal on this date.    STG 3: Patient will recall novel paragraph length stimuli with 75% accuracy following a 10 minute delay, with mod verbal and visual cues for use of compensatory memory strategies.  Progress: 75% immediate recall given mod verbal and visual cues    STG 4: Patient will accurately complete working memory tasks within 80% of opportunities given mod verbal and visual cueing.  Progress: 70% given mod verbal and visual cues    HEP: compensatory memory strategies  Education: importance of attention for memory    Assessment: Patient presents with cognitive-communication deficits c/b difficulty recalling novel paragraph length information, recalling personal  facts, and lexical retrieval. Patient's deficits impact patient's ability to recall pertinent information and engage in meaningful interactions. On this date, patient completed recall and working memory tasks. Patient indicated that she has difficulty recalling daily events and people's names. Patient provided with HEP for memory journal and to have f/u session in 1 week to determine carryover and appropriateness for continued therapy.       Plan: Continue speech-language therapy targeting cognitive-communication.

## 2024-03-14 ENCOUNTER — APPOINTMENT (OUTPATIENT)
Dept: SPEECH THERAPY | Facility: HOSPITAL | Age: 59
End: 2024-03-14
Attending: Other
Payer: COMMERCIAL

## 2024-03-19 ENCOUNTER — APPOINTMENT (OUTPATIENT)
Dept: SPEECH THERAPY | Facility: HOSPITAL | Age: 59
End: 2024-03-19
Attending: Other
Payer: COMMERCIAL

## 2024-03-20 ENCOUNTER — VIRTUAL PHONE E/M (OUTPATIENT)
Dept: UROLOGY | Facility: CLINIC | Age: 59
End: 2024-03-20
Attending: OBSTETRICS & GYNECOLOGY
Payer: COMMERCIAL

## 2024-03-20 ENCOUNTER — TELEPHONE (OUTPATIENT)
Dept: UROLOGY | Facility: CLINIC | Age: 59
End: 2024-03-20

## 2024-03-20 DIAGNOSIS — Z91.199 NO-SHOW FOR APPOINTMENT: Primary | ICD-10-CM

## 2024-03-20 NOTE — TELEPHONE ENCOUNTER
Spoke with patient to reschedule missed 6 WEEK BLADDER PAIN PHONE FOLLOW-UP per JONNA Reyez as patient was not available for 3pm phone appointment.  Per patient, she will be out of town after a couple of days for one week and is unsure if she will be available for a phone follow-up.  Patient states she will call back to reschedule if needed.

## 2024-03-20 NOTE — TELEPHONE ENCOUNTER
Attempted to reach patient a second time to complete check-in for her 3pm phone appointment.  LVM for patient to call back to finalize check-in.

## 2024-03-20 NOTE — TELEPHONE ENCOUNTER
Contacted patient to complete check-in process for phone appointment.  LVM for patient to call back to complete check-in   Detail Level: Zone Detail Level: Detailed

## 2024-04-19 ENCOUNTER — OFFICE VISIT (OUTPATIENT)
Dept: NEUROLOGY | Facility: CLINIC | Age: 59
End: 2024-04-19
Payer: COMMERCIAL

## 2024-04-19 VITALS
WEIGHT: 155.81 LBS | SYSTOLIC BLOOD PRESSURE: 100 MMHG | HEART RATE: 75 BPM | BODY MASS INDEX: 24 KG/M2 | RESPIRATION RATE: 16 BRPM | DIASTOLIC BLOOD PRESSURE: 70 MMHG

## 2024-04-19 DIAGNOSIS — R41.3 MEMORY LOSS: Primary | ICD-10-CM

## 2024-04-19 PROCEDURE — 3074F SYST BP LT 130 MM HG: CPT | Performed by: OTHER

## 2024-04-19 PROCEDURE — 3078F DIAST BP <80 MM HG: CPT | Performed by: OTHER

## 2024-04-19 PROCEDURE — 99214 OFFICE O/P EST MOD 30 MIN: CPT | Performed by: OTHER

## 2024-04-19 NOTE — PROGRESS NOTES
[facility] Neurology Outpatient Progress Note  Date of service: 4/19/2024    Assessment:     ICD-10-CM    1. Memory loss  R41.3    Doubt dementing process, likely mood disorder related     Cognitive deficit  Family history of alcohol related dementia  Eye twitch; unchanged, opthalm to follow     Plan:  Neuropsych test report pending at [facility]  EEG reviewed, normal  Reviewed recent MRI brain, chronic changes only  PCP/Psychiatrist to follow  See orders and medications filed with this encounter. The patient indicates understanding of these issues and agrees with the plan.  Discussed with patient regarding assessment,  care plan   RTC 6 months, will call pt with neuropsych test result  Pt should go ER for any new or worsening symptoms and contact office f    Subjective:   History:  Patient here for a follow-up visit for memory deficit. Since last visit no change, has done eeg, lab and neuropsych test.   Here to review test result and care plan.  Per initial visit note:  Alannah Troncoso is a 58 year old female with past medical history as listed below presents here for initial evaluation of memory loss for 1 year; Patient states decrease in memory, patient is having difficulty with word finding and increase in stuttering. Patient states this started about a year ago, patient advise she might have had a \"TIA\". Patient states increase in itching of the RUE. Patient states decrease in balance. Denies recent head trauma. Patient states twitching of the right eye, patient states this occurs to 15-20 times a day. she states she does have some degree of baseline learning/reading difficulty throughout her whole life.       History/Other:   REVIEW OF SYSTEMS:  A 10-point system was reviewed. Pertinent positives and negatives are noted as above     No current outpatient medications on file.  Allergies:  Allergies   Allergen Reactions    Dilaudid [Hydromorphone] OTHER (SEE COMMENTS)     Temporary paralysis of  left side     Past Medical History:    Anesthesia complication    difficult to wake up sometimes    Back pain    Back problem    cervical and thoracic    Bad breath    Blurred vision    Chest pain on exertion    Constipation    Decorative tattoo    Depression    comes and goes    Dizziness    Easy bruising    Endometriosis    Esophageal reflux    Fatigue    Feeling lonely    Frequent urination    Heartburn    High cholesterol    denies    History of depression    Hx of motion sickness    IBS (irritable bowel syndrome)    Internal hemorrhoid    burst x2    Irregular bowel habits    Itch of skin    Lab test positive for detection of COVID-19 virus    5/25/22 positive- no hospitalization- had a slight cough and some fatigue       Leaking of urine    Night sweats    Pain in joints    Pain with bowel movements    Painful swallowing    Painful urination    Partial tear of common extensor tendon of elbow    (right elbow)   Surgery scheduled w Dr Lepe, 7/6/22    Problems with swallowing    resolved    Scoliosis    Sinusitis    Sleep disturbance    Sputum production    Stress    Tooth infection    Vertigo    Weight gain     Past Surgical History:   Procedure Laterality Date    Adenoidectomy  1979    done with tonsillectomy    Cholecystectomy  2019    Colonoscopy  2001    Colonoscopy  11/2016    fair prep- repeat 3 yrs    Colonoscopy N/A 11/02/2016    Procedure: COLONOSCOPY;  Surgeon: Cirilo Gutierrez MD;  Location:  ENDOSCOPY    Elbow surgery  2022    Elbow surgery Right 2024    Hysterectomy      unsure if total or partial    Luz localization wire 1 site right (cpt=19281) Right 1982    Benign    Luz localization wire 1 site right (cpt=19281)  09/2020    Other Right 2020, 1982    breast lumpectomy    Other      2021  foot surgery on both feet / hammertoe    Other surgical history      laprascopy x 15 (endometriosis) from 4900-8073    Other surgical history  1979    pilonidal cyst removal    Other surgical history  2017     urinary sling    Tonsillectomy       Social History:  Social History     Tobacco Use    Smoking status: Every Day     Current packs/day: 1.50     Average packs/day: 1.5 packs/day for 35.0 years (52.5 ttl pk-yrs)     Types: Cigarettes    Smokeless tobacco: Never   Substance Use Topics    Alcohol use: Yes     Alcohol/week: 0.0 standard drinks of alcohol     Comment: rarely; less than 1 drink/week     Family History   Problem Relation Age of Onset    Heart Disease Father     Heart Attack Father     Other (Kidney Cancer) Mother 52    Suicide History Paternal Grandfather          by suicide    Other (Lung Cancer) Paternal Grandfather         smoked cigars    Breast Cancer Maternal Aunt 65        estimated age    Alcohol and Other Disorders Associated Maternal Grandmother     Alcohol and Other Disorders Associated Maternal Grandfather     Breast Cancer Maternal Cousin Female     Breast Cancer Paternal Cousin Female     Breast Cancer Paternal Cousin Female     Stroke Neg       Objective:   Neurological Examination:  /70   Pulse 75   Resp 16   Wt 155 lb 12.8 oz (70.7 kg)   BMI 24.40 kg/m²   Language: normal   Speech: no dysarthria  CN: II-XII intact  Motor strength: 5/5 all extremities  Tone: normal  DTRs: 2+ symmetric  Coordination: Normal FTN  Sensory: symmetric   Gait: nl    Test reviewed on 2024      Siva \"Chirag\" MD Jason  Neurology  [facility]  2024, 8:33 AM  CC: Rojas Bell DO

## 2024-04-22 ENCOUNTER — OFFICE VISIT (OUTPATIENT)
Dept: ORTHOPEDICS CLINIC | Facility: CLINIC | Age: 59
End: 2024-04-22
Payer: OTHER MISCELLANEOUS

## 2024-04-22 VITALS — BODY MASS INDEX: 24.33 KG/M2 | WEIGHT: 155 LBS | HEIGHT: 67 IN

## 2024-04-22 DIAGNOSIS — M77.11 LATERAL EPICONDYLITIS OF RIGHT ELBOW: Primary | ICD-10-CM

## 2024-04-22 PROCEDURE — 99024 POSTOP FOLLOW-UP VISIT: CPT | Performed by: PHYSICIAN ASSISTANT

## 2024-04-22 NOTE — PROGRESS NOTES
Clinic Note [facility]     Assessment/Plan:  58 year old female    Right elbow common extensor tendon debridement and repair 7/6/2022 with subsequent deep suture removal on 3/4/2024-patient doing well with minimal pain.  She will continue working full duty.  She is happy with her recovery thus far.  She will contact us as needed  Patient does use a nicotine product specifically cigarettes which can create increased inflammatory response and slow down recovery and healing.  She may benefit from smoking cessation to facilitate healing.  Work status: Patient can work without restrictions    Follow Up: As needed.  Patient at [facility]         Diagnostic Studies:  X-ray right elbow 3 views: No fractures dislocations or osseous abnormalities  MRI right elbow: A partial tear of the common extensor tendon with significant tendonitic changes  MRI right elbow: Healed common extensor tendon repair.  No evidence of retear noted.    Physical Exam:    Ht 5' 7\" (1.702 m)   Wt 155 lb (70.3 kg)   BMI 24.28 kg/m²     Constitutional: NAD. AOx3. Well-developed and Well-nourished.   Psychiatric: Normal mood/ affect/ behavior. Judgment and thought content normal.     Right upper Extremity:   Inspection: Incision healed well with no signs of infection.   Palpation: Nontender along the incision and lateral epicondyle   Motion: Elbow:normal  Wrist: normal bilateral symmetric ext/flex/sup/pro  Finger: full composite fist   Special Tests:  No reproduction of the radial nerve symptoms with resisted pronation or supination.  No pain with resisted middle finger extension and wrist extension.  Sensation is otherwise intact in the median and ulnar and radial sensory nerve distribution when not provoked.  Motor functions intact AIN, PIN, ulnar motor nerve.           CC: Right elbow pain    HPI: This 58 year old right-hand-dominant female presents with right elbow pain after an injury at work on May 12.  A heavy steel glass door hit the lateral  aspect of her elbow.  She was initially evaluated with x-rays negative however her wrist pain has failed to improve in any significant manner.  She rates the pain is moderate to severe.  She describes the pain as sharp, constant, aching, shooting, and locking in nature.  She does wear wrist brace that does help her.  She tried a counterforce brace that that was uncomfortable and discontinued its use.    Interval history: Patient had deep suture removal and her postop pain is slowly improving.    Past Medical History:    Anesthesia complication    difficult to wake up sometimes    Back pain    Back problem    cervical and thoracic    Bad breath    Blurred vision    Chest pain on exertion    Constipation    Decorative tattoo    Depression    comes and goes    Dizziness    Easy bruising    Endometriosis    Esophageal reflux    Fatigue    Feeling lonely    Frequent urination    Heartburn    High cholesterol    denies    History of depression    Hx of motion sickness    IBS (irritable bowel syndrome)    Internal hemorrhoid    burst x2    Irregular bowel habits    Itch of skin    Lab test positive for detection of COVID-19 virus    5/25/22 positive- no hospitalization- had a slight cough and some fatigue       Leaking of urine    Night sweats    Pain in joints    Pain with bowel movements    Painful swallowing    Painful urination    Partial tear of common extensor tendon of elbow    (right elbow)   Surgery scheduled w Dr Lepe, 7/6/22    Problems with swallowing    resolved    Scoliosis    Sinusitis    Sleep disturbance    Sputum production    Stress    Tooth infection    Vertigo    Weight gain     Past Surgical History:   Procedure Laterality Date    Adenoidectomy  1979    done with tonsillectomy    Cholecystectomy  2019    Colonoscopy  2001    Colonoscopy  11/2016    fair prep- repeat 3 yrs    Colonoscopy N/A 11/02/2016    Procedure: COLONOSCOPY;  Surgeon: Cirilo Gutierrez MD;  Location:  ENDOSCOPY    Elbow  surgery      Elbow surgery Right     Hysterectomy      unsure if total or partial    Luz localization wire 1 site right (cpt=19281) Right     Benign    Luz localization wire 1 site right (cpt=19281)  2020    Other Right 1982    breast lumpectomy    Other        foot surgery on both feet / hammertoe    Other surgical history      laprascopy x 15 (endometriosis) from 1349-3052    Other surgical history      pilonidal cyst removal    Other surgical history      urinary sling    Tonsillectomy       No current outpatient medications on file.     Allergies   Allergen Reactions    Dilaudid [Hydromorphone] OTHER (SEE COMMENTS)     Temporary paralysis of left side     Family History   Problem Relation Age of Onset    Heart Disease Father     Heart Attack Father     Other (Kidney Cancer) Mother 52    Suicide History Paternal Grandfather          by suicide    Other (Lung Cancer) Paternal Grandfather         smoked cigars    Breast Cancer Maternal Aunt 65        estimated age    Alcohol and Other Disorders Associated Maternal Grandmother     Alcohol and Other Disorders Associated Maternal Grandfather     Breast Cancer Maternal Cousin Female     Breast Cancer Paternal Cousin Female     Breast Cancer Paternal Cousin Female     Stroke Neg      Social History     Occupational History    Not on file   Tobacco Use    Smoking status: Every Day     Current packs/day: 1.50     Average packs/day: 1.5 packs/day for 35.0 years (52.5 ttl pk-yrs)     Types: Cigarettes    Smokeless tobacco: Never   Vaping Use    Vaping status: Never Used   Substance and Sexual Activity    Alcohol use: Yes     Alcohol/week: 0.0 standard drinks of alcohol     Comment: rarely; less than 1 drink/week    Drug use: Yes     Types: Cannabis     Comment: once in a while    Sexual activity: Not on file        Review of Systems (negative unless bolded):  General: fevers, chills, fatigue  CV:  chest pain, palpitations, leg  swelling  Msk: bodyaches, neck pain, neck stiffness  Skin: rashes, open wounds, nonhealing ulcers  Hem: bleeds easily, bruise easily, immunocompromised  Neuro: dizziness, light headedness, headaches  Psych: anxious, depressed, anger issues  Samantha Sánchez PA-C  Hand, Wrist, & Elbow Surgery  Physician Assistant to Dr. Fish Lepe  [facility]  [address] [place] [address], [address], [email or web address]  [email or web address]  t: [phone]  f: [phone]

## 2024-04-23 ENCOUNTER — TELEPHONE (OUTPATIENT)
Dept: INTERNAL MEDICINE CLINIC | Facility: CLINIC | Age: 59
End: 2024-04-23

## 2024-04-23 NOTE — TELEPHONE ENCOUNTER
\"Please inform patient that she is due for colonoscopy and mammogram this year. Thank you!\"       Lm for pt to schedule mammogram and colonoscopy.    Sending mcm

## 2024-05-02 NOTE — TELEPHONE ENCOUNTER
Left 2nd vm-patient has not read [facility] to schedule her colonoscopy or mammogram.

## 2024-07-08 ENCOUNTER — TELEPHONE (OUTPATIENT)
Facility: CLINIC | Age: 59
End: 2024-07-08

## 2024-07-11 ENCOUNTER — OFFICE VISIT (OUTPATIENT)
Dept: SURGERY | Facility: CLINIC | Age: 59
End: 2024-07-11
Payer: COMMERCIAL

## 2024-07-11 VITALS
OXYGEN SATURATION: 98 % | RESPIRATION RATE: 16 BRPM | DIASTOLIC BLOOD PRESSURE: 61 MMHG | HEART RATE: 69 BPM | WEIGHT: 154 LBS | SYSTOLIC BLOOD PRESSURE: 107 MMHG | TEMPERATURE: 98 F | BODY MASS INDEX: 24 KG/M2

## 2024-07-11 DIAGNOSIS — Z12.31 SCREENING MAMMOGRAM FOR BREAST CANCER: ICD-10-CM

## 2024-07-11 DIAGNOSIS — R92.30 DENSE BREAST TISSUE: Primary | ICD-10-CM

## 2024-07-11 PROCEDURE — 3078F DIAST BP <80 MM HG: CPT

## 2024-07-11 PROCEDURE — 3074F SYST BP LT 130 MM HG: CPT

## 2024-07-11 PROCEDURE — 99214 OFFICE O/P EST MOD 30 MIN: CPT

## 2024-07-15 NOTE — PROGRESS NOTES
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    Stage: Not applicable    Disease Status:  Surgical treatment complete, no further treatment pending.    History:   This 58 year old woman presented with self detected discomfort related to a palpable mass in the right axilla.  She reports that she is noted this since March 2020.  Given the discomfort and presence of this she underwent a bilateral diagnostic evaluation in May 7, 2020.  At that time she was found to have a well-defined hypoechoic mass at 9:00, 6 cm from the nipple for which surveillance was recommended.  She had a right breast surveillance ultrasound on August 13, 2020 that showed persistence of a 1.1 x 0.8 x 0.4 cm hypoechoic mass at this site.  She reports that she has had increased symptoms and anxiety related to the mass since its discovery.  She denies any nipple discharge, skin changes or other findings bilaterally.  She does have an extensive family history of breast cancer.  She does have a remote history of biopsies of the right breast back in 1983 for benign findings.  She elected for surgical excision of the mass which took place without complication. Most recent breast imaging what a bilateral diagnostic mammogram on 3/20/2023 which showed a region of hypoechogenicity in the left breast. This area underwent biopsy and was found to be benign. She continues to have tenderness to her right breast and has also noticed more abdominal distention lately. She is here today for evaluation and recommendations for further therapy.        Past Medical History:    Anesthesia complication    difficult to wake up sometimes    Back pain    Back problem    cervical and thoracic    Bad breath    Blurred vision    Chest pain on exertion    Constipation    Decorative tattoo    Depression    comes and goes    Dizziness    Easy bruising    Endometriosis    Esophageal reflux     Fatigue    Feeling lonely    Frequent urination    Heartburn    High cholesterol    denies    History of depression    Hx of motion sickness    IBS (irritable bowel syndrome)    Internal hemorrhoid    burst x2    Irregular bowel habits    Itch of skin    Lab test positive for detection of COVID-19 virus    22 positive- no hospitalization- had a slight cough and some fatigue       Leaking of urine    Night sweats    Pain in joints    Pain with bowel movements    Painful swallowing    Painful urination    Partial tear of common extensor tendon of elbow    (right elbow)   Surgery scheduled w Dr Lepe, 22    Problems with swallowing    resolved    Scoliosis    Sinusitis    Sleep disturbance    Sputum production    Stress    Tooth infection    Vertigo    Weight gain       Past Surgical History:   Procedure Laterality Date    Adenoidectomy  1979    done with tonsillectomy    Cholecystectomy  2019    Colonoscopy      Colonoscopy  2016    fair prep- repeat 3 yrs    Colonoscopy N/A 2016    Procedure: COLONOSCOPY;  Surgeon: Cirilo Gutierrez MD;  Location:  ENDOSCOPY    Elbow surgery      Elbow surgery Right     Hysterectomy      unsure if total or partial    Luz localization wire 1 site right (cpt=19281) Right     Benign    Luz localization wire 1 site right (cpt=19281)  2020    Other Right ,     breast lumpectomy    Other        foot surgery on both feet / hammertoe    Other surgical history      laprascopy x 15 (endometriosis) from 6170-4857    Other surgical history      pilonidal cyst removal    Other surgical history      urinary sling    Tonsillectomy  1979       Gynecological History:  Pt is a   Pt was 20 years old at time of first pregnancy.    She denies any cumulative breastfeeding history.  She achieved menarche at age 14 and LMP     Age of Menopause: 31  Type: hysterectomy  She denies any history of hormone replacement therapy.  She has history of  oral contraceptive use for 1 years, last   She has recieved infertility treatment to achieve pregnancy.    Medications:    No outpatient medications have been marked as taking for the 24 encounter (Office Visit) with Layne Solares APRN.       Allergies:    Allergies   Allergen Reactions    Dilaudid [Hydromorphone] OTHER (SEE COMMENTS)     Temporary paralysis of left side       Family History:   Family History   Problem Relation Age of Onset    Heart Disease Father     Heart Attack Father     Other (Kidney Cancer) Mother 52    Suicide History Paternal Grandfather          by suicide    Other (Lung Cancer) Paternal Grandfather         smoked cigars    Breast Cancer Maternal Aunt 65        estimated age    Alcohol and Other Disorders Associated Maternal Grandmother     Alcohol and Other Disorders Associated Maternal Grandfather     Breast Cancer Maternal Cousin Female     Breast Cancer Paternal Cousin Female     Breast Cancer Paternal Cousin Female     Stroke Neg        She is not of Ashkenazi Orthodoxy ancestry.    Social History:  History   Alcohol Use    0.0 standard drinks of alcohol/week     Comment: rarely; less than 1 drink/week       History   Smoking Status    Every Day    Types: Cigarettes   Smokeless Tobacco    Never   The patient is .  She has 2 children.  She is currently working as a .    Review of Systems:  General:   The patient denies, fever, chills, night sweats, +fatigue, generalized weakness, change in appetite or weight loss.    HEENT:     The patient denies eye irritation, cataracts, redness, glaucoma, yellowing of the eyes, change in vision or color blindness. The patient denies hearing loss, ringing in the ears, ear drainage, earaches, nasal congestion, nose bleeds, snoring, pain in mouth/throat, hoarseness, change in voice, facial trauma.    Respiratory:  The patient denies chronic cough, phlegm, hemoptysis, pleurisy/chest pain, pneumonia, asthma, wheezing,  difficulty in breathing with exertion, emphysema, chronic bronchitis, shortness of breath or abnormal sound when breathing.     Cardiovascular:  There is no history of chest pain, chest pressure/discomfort, palpitations, irregular heartbeat, fainting or near-fainting, difficulty breathing when lying flat, SOB/Coughing at night, swelling of the legs or chest pain while walking.    Breasts:  See history of present illness    Gastrointestinal:     There is no history of difficulty or pain with swallowing, reflux symptoms, vomiting, dark or bloody stools, constipation, yellowing of the skin, indigestion, nausea,+change in bowel habits, +diarrhea, abdominal pain or vomiting blood.     Genitourinary:  The patient denies+ frequent urination, +needing to get up at night to urinate, urinary hesitancy or retaining urine,+ painful urination, +urinary incontinence, +decreased urine stream, blood in the urine or vaginal/penile discharge.    Skin:    The patient denies rash, itching,+ skin lesions, dry skin, change in skin color or +change in moles.     Hematologic/Lymphatic:  The patient denies easily bruising or bleeding or persistent swollen glands or lymph nodes.     Musculoskeletal:  The patient denies muscle aches/pain, joint pain, stiff joints, neck pain, back pain or bone pain.    Neuropsychiatric:  There is no history of migraines or severe headaches, seizure/epilepsy, speech problems, coordination problems, trembling/tremors, fainting/black outs, dizziness,+ memory problems, loss of sensation/numbness, problems walking, weakness, +tingling or burning in hands/feet. There is no history of abusive relationship, bipolar disorder, sleep disturbance, anxiety, +depression or feeling of despair.    Endocrine:    There is no history of poor/slow wound healing, weight loss/gain, fertility or hormone problems, cold intolerance, thyroid disease.     Allergic/Immunologic:  There is no history of hives, hay fever, angioedema or  anaphylaxis.    /61 (BP Location: Right arm, Patient Position: Sitting, Cuff Size: adult)   Pulse 69   Temp 98 °F (36.7 °C) (Tympanic)   Resp 16   Wt 69.9 kg (154 lb)   SpO2 98%   BMI 24.12 kg/m²     Physical Exam:  The patient is an alert, oriented, well-nourished and  well-developed woman who appears her stated age. Her speech patterns and movements are normal. Her affect is appropriate.    HEENT: The head is normocephalic. The neck is supple. The thyroid is not enlarged and is without palpable masses/nodules. There are no palpable masses. The trachea is in the midline. Conjunctiva are clear, non-icteric.    Chest: The chest expands symmetrically. The lungs are clear to auscultation.    Heart: The rhythm is regular.  There are no murmurs, rubs, gallops or thrills.    Breasts:  Her breasts are symmetrical with a cup size 38D.  Right breast: The skin, nipple ,and areola appear normal. There is no skin dimpling with movement of the pectoralis. There is no nipple retraction. No nipple discharge can be elicited. The parenchyma is mildly nodular. There there is a well-healed incision on the right breast with no signs of erythema or underlying fluctuance. The axillary tail is normal. +pain to palpation  Left breast:   The skin, nipple, and areola appear normal. There is no skin dimpling with movement of the pectoralis. There is no nipple retraction. No nipple discharge can be elicited. The parenchyma is mildly nodular. There are no dominant masses in the breast. The axillary tail is normal. +pain to palpation    Abdomen:  The abdomen is soft, flat and non tender. The liver is not enlarged. There are no palpable masses.    Lymph Nodes:  The supraclavicular, axillary and cervical regions are free of significant lymphadenopathy.    Back: There is no vertebral column tenderness.    Skin: The skin appears normal. There are no suspicious appearing rashes or lesions.    Extremities: The extremities are without  deformity, cyanosis or edema.    Impression:   Ms. Alannah Troncoso is a 58 year old woman presents with a symptomatic right breast mass status post surgical excision with right breast pain.    Discussion and Plan:  I had a discussion with the Patient regarding her breast exam. On exam today I found the patient to be tender to her right breast. An in office ultrasound did not show any abnormalities. I am recommending a mammogram. Due to her dense breast tissue I am recommending whole breast ultrasounds in addition to mammography. We will be in touch with the patient after her imaging is complete. I recommend patient follow up with her PCP for her abdominal distention. She was encouraged to contact the office with any questions or concerns prior to her next scheduled appointment.

## 2024-07-18 DIAGNOSIS — N64.4 BREAST PAIN, RIGHT: Primary | ICD-10-CM

## 2024-07-19 ENCOUNTER — HOSPITAL ENCOUNTER (OUTPATIENT)
Dept: MAMMOGRAPHY | Facility: HOSPITAL | Age: 59
Discharge: HOME | End: 2024-07-19
Payer: COMMERCIAL

## 2024-07-19 DIAGNOSIS — N64.4 BREAST PAIN, RIGHT: ICD-10-CM

## 2024-07-19 PROCEDURE — 77062 BREAST TOMOSYNTHESIS BI: CPT

## 2024-07-19 PROCEDURE — 76642 ULTRASOUND BREAST LIMITED: CPT

## 2024-07-19 PROCEDURE — 77066 DX MAMMO INCL CAD BI: CPT

## 2024-07-31 NOTE — TELEPHONE ENCOUNTER
Request received for 4/22/24.     Faxed notes to workers comp  at [phone].     Received fax confirmation.

## 2024-11-15 ENCOUNTER — TELEPHONE (OUTPATIENT)
Dept: INTERNAL MEDICINE CLINIC | Facility: CLINIC | Age: 59
End: 2024-11-15

## 2024-11-19 ENCOUNTER — PATIENT MESSAGE (OUTPATIENT)
Dept: INTERNAL MEDICINE CLINIC | Facility: CLINIC | Age: 59
End: 2024-11-19

## 2025-01-03 ENCOUNTER — PATIENT OUTREACH (OUTPATIENT)
Dept: CASE MANAGEMENT | Age: 60
End: 2025-01-03

## 2025-03-29 NOTE — MR AVS SNAPSHOT
After Visit Summary   2/21/2024    Alannah Troncoso   MRN: [id number]           Visit Information     Date & Time  2/21/2024  7:30 AM Provider  [email or web address] Department  [facility] EEG Dept. Phone  [phone]      Allergies as of 2/21/2024  Review status set to In Progress on 1/25/2024       Noted Reaction Type Reactions    Adhesive Tape 10/20/2016        blisters    Dilaudid [hydromorphone] 10/26/2016    OTHER (SEE COMMENTS)    Temporary paralysis of left side      Your Current Medications        Dosage    diazePAM 10 MG Oral Tab Place 1 tablet (10 mg total) vaginally nightly.    estradiol (ESTRACE) 0.1 MG/GM Vaginal Cream Apply 1/2 gram vaginally 2 times per week.      Diagnoses for This Visit    Memory loss   [780.93.ICD-9-CM]             We Ordered the Following     Normal Orders This Visit    EEG (At [facility]) [NEU4 CUSTOM]       Future Appointments        Provider Department    2/27/2024 3:30 PM [facility] Language Therapy    3/5/2024 3:30 PM [facility]    3/7/2024 3:30 PM [facility]    3/11/2024 11:20 AM Samantha Sánchez [facility], [address] [place] [address]    3/12/2024 3:30 PM [facility] Speech [facility] Language Therapy    3/14/2024 3:30 PM [facility] Language Therapy    3/19/2024 3:30 PM [facility] Speech [facility] Language Therapy    3/20/2024 3:00 PM Courtney Manuel [facility]    4/3/2024 11:00 AM Siva Gomez [facility], [facility]                Did you know that [facility] physicians now offer Video Visits through [facility] for adult patients for a variety of conditions such as allergies, back pain and cold symptoms? Skip the drive and waiting room and online chat with a doctor face-to-face using your  The left DP pulse is +2. The left PT pulse is +2. The right radial pulse is +2. web-cam enabled computer or mobile device wherever you are. Video Visits cost $50 and can be paid hassle-free using a credit, debit, or health savings card.  Not active on [employer]? Ask us how to get signed up today!          If you receive a survey from [employer], please take a few minutes to complete it and provide feedback. We strive to deliver the best patient experience and are looking for ways to make improvements. Your feedback will help us do so. For more information on [employer], please visit [email or web address]           No text in SmartText           No text in SmartText

## (undated) DEVICE — DRAPE C-ARM UNIVERSAL

## (undated) DEVICE — 3M™ STERI-DRAPE™ INSTRUMENT POUCH 1018: Brand: STERI-DRAPE™

## (undated) DEVICE — #11 STERILE BLADE: Brand: POLYMER COATED BLADES

## (undated) DEVICE — ENDOPATH ULTRA VERESS INSUFFLATION NEEDLES WITH LUER LOCK CONNECTORS: Brand: ENDOPATH

## (undated) DEVICE — STERILE POLYISOPRENE POWDER-FREE SURGICAL GLOVES: Brand: PROTEXIS

## (undated) DEVICE — LAP CHOLE/APPY CDS-LF: Brand: MEDLINE INDUSTRIES, INC.

## (undated) DEVICE — SUTURE VICRYL 3-0 SH

## (undated) DEVICE — 3M™ STERI-STRIP™ REINFORCED ADHESIVE SKIN CLOSURES, R1547, 1/2 IN X 4 IN (12 MM X 100 MM), 6 STRIPS/ENVELOPE: Brand: 3M™ STERI-STRIP™

## (undated) DEVICE — CHLORAPREP 26ML APPLICATOR

## (undated) DEVICE — ISOPROPYL ALCOHOL 70% 4OZ BTL

## (undated) DEVICE — DRAPE HALF 40X58 DYNJP2410

## (undated) DEVICE — SOLUTION  .9 1000ML BTL

## (undated) DEVICE — DISPOSABLE TOURNIQUET CUFF SINGLE BLADDER, DUAL PORT AND QUICK CONNECT CONNECTOR: Brand: COLOR CUFF

## (undated) DEVICE — ENDOPATH 5MM CURVED SCISSORS WITH MONOPOLAR CAUTERY: Brand: ENDOPATH

## (undated) DEVICE — KENDALL SCD EXPRESS SLEEVES, KNEE LENGTH, MEDIUM: Brand: KENDALL SCD

## (undated) DEVICE — ESSENTIAL SHOULDER SLING LARGE

## (undated) DEVICE — SUTURE SILK 2-0 FS

## (undated) DEVICE — Device

## (undated) DEVICE — GOWN,SIRUS,FABRIC-REINFORCED,LARGE: Brand: MEDLINE

## (undated) DEVICE — DRAPE,TAPE STRIPS,STERILE: Brand: MEDLINE

## (undated) DEVICE — TOWEL OR BLU 16X26 STRL

## (undated) DEVICE — NON-ADHERENT STRIPS,OIL EMULSION: Brand: CURITY

## (undated) DEVICE — TIGERTAIL 5F FLXTIP 70CM

## (undated) DEVICE — STERILE POLYISOPRENE POWDER-FREE SURGICAL GLOVES WITH EMOLLIENT COATING: Brand: PROTEXIS

## (undated) DEVICE — BANDAID COVERLET 1X3

## (undated) DEVICE — DISPOSABLE BIPOLAR FORCEPS 4" (10.2CM) JEWELERS, STRAIGHT 0.4MM TIP AND 12 FT. (3.6M) CABLE: Brand: KIRWAN

## (undated) DEVICE — TISSUE RETRIEVAL SYSTEM: Brand: INZII RETRIEVAL SYSTEM

## (undated) DEVICE — GOWN,SIRUS,FABRIC-REINFORCED,X-LARGE: Brand: MEDLINE

## (undated) DEVICE — SUT VICRYL 0 UR-6 J603H

## (undated) DEVICE — C-ARM: Brand: UNBRANDED

## (undated) DEVICE — TROCAR: Brand: KII® SLEEVE

## (undated) DEVICE — VIOLET BRAIDED (POLYGLACTIN 910), SYNTHETIC ABSORBABLE SUTURE: Brand: COATED VICRYL

## (undated) DEVICE — TROCAR: Brand: KII SHIELDED BLADED ACCESS SYSTEM

## (undated) DEVICE — DISPOSABLE LAPAROSCOPIC CLIP APPLIER WITH 20 CLIPS.: Brand: EPIX® UNIVERSAL CLIP APPLIER

## (undated) DEVICE — SUTURE MONOCRYL 4-0 PS-2

## (undated) DEVICE — SUTURE VICRYL 0

## (undated) DEVICE — LIGHT HANDLE

## (undated) DEVICE — SOL  .9 1000ML BTL

## (undated) DEVICE — ABDOMINAL PAD: Brand: DERMACEA

## (undated) DEVICE — HEMOCLIP HORIZON MED 002200

## (undated) DEVICE — GAMMEX® NON-LATEX PI TEXTURED SIZE 6.5, STERILE POLYISOPRENE POWDER-FREE SURGICAL GLOVE: Brand: GAMMEX

## (undated) DEVICE — HEMOCLIP HORIZON SM 001200

## (undated) DEVICE — CONT SPEC SURG FAXITRON WEDGE

## (undated) DEVICE — MINI-BLADE®: Brand: BEAVER®

## (undated) DEVICE — UPPER EXTREMITY CDS-LF: Brand: MEDLINE INDUSTRIES, INC.

## (undated) DEVICE — CLOSURE EXOFIN 1.0ML

## (undated) DEVICE — UNDYED BRAIDED (POLYGLACTIN 910), SYNTHETIC ABSORBABLE SUTURE: Brand: COATED VICRYL

## (undated) DEVICE — BREAST-HERNIA-PORT CDS-LF: Brand: MEDLINE INDUSTRIES, INC.

## (undated) DEVICE — UNDERPAD 23X36 LIGHT ASBORB

## (undated) DEVICE — CAUTERY BLADE 2IN INS E1455

## (undated) DEVICE — SLEEVE KENDALL SCD EXPRESS MED

## (undated) DEVICE — GAUZE SPONGES,12 PLY: Brand: CURITY

## (undated) DEVICE — DRAPE PACK CHEST & U BAR

## (undated) DEVICE — SYRINGE 10ML LL TIP

## (undated) DEVICE — NITINOL WIRE STR 035

## (undated) DEVICE — SUTURE VICRYL 0 UR-6

## (undated) DEVICE — SUT MONOCRYL 4-0 PS-2 Y496G

## (undated) NOTE — LETTER
Date & Time: 11/27/2022, 12:53 AM  Patient: Manuel Conception  Encounter Provider(s): Rylee Mcelroy DO       To Whom It May Concern:    Amaya Alfonso was seen and treated in our department on 11/26/2022.      If you have any questions or concerns, please do not hesitate to call.        _____________________________  Physician/APC Signature

## (undated) NOTE — LETTER
2019    Return to School / Work    Name: Sofia Chaney        : 1965    To Whom It May Concern,    Sofia Chaney will be having surgery today, 19, and will be unable to return to work on  and .        Comments:    If ther

## (undated) NOTE — LETTER
2021    RE: Lay Navas     : 1965    Dear Dr. Sarina Collier,    This letter is to inform you that your patient's surgery date has been changed to 21 with Dr. eJnnifer Pascal at [facility].  We have asked the patient to contact your office to s

## (undated) NOTE — LETTER
Patient Name: Desi Weiss  YOB: 1965          MRN :  [id number]  Date:  9/2/2022  Referring Physician:  Robert Rodriguez    Progress Summary  Pt has attended 11 visits in Occupational Therapy. Subjective: \"I'm frustrated because it won't get completely straight. \"  Pt able to lift boxes, perform household tasks, however has difficulty with lifting and pouring from coffee pot. Pain: 0/10 at rest      Objective: Elbow extension/flexion prior to treatment R=10/147 at start of session. L=5/145   R=37 L=46    Assessment:   Pt able to tolerate increased PREs with no increase in pain including lifting tasks and activities which provide mild stress to wrist extensors. Added theraband to HEP. Limitations persist in terminal extension of elbow and pt encouraged to perform self stretch as well as frequent reaching tasks to end range extension. Passive extension equal to opposite UE, though pain persists at end ranges. Pt also feels as if elbow \"catches\" if held in one spot too long. Pt to return to MD next week, will follow MD recommendations regarding continued tx, however pt may be ready to d/c to HEP only if performs consistently. Goals: (to be met in 12 visits)  Pt will be independent and compliant with comprehensive HEP to maintain progress achieved in OT  Pt will increase elbow extension to at least 10-15 deg for improved reaching into cabinets: met for 10  Pt will increase elbow flexion to at least 145 for self feeding: met  Pt will sleep at least 6 hours nightly without waking due to elbow pain: met  Pt will increase wrist ROM to be [facility] [place] for use while performing facial hygiene tasks: met  Will continue to upgrade as appropriate. Plan: Will follow MD recommendations regarding continued tx. Will d/c if no further orders received.      Patient/Family/Caregiver was advised of these findings, precautions, and treatment options and has agreed to actively participate in planning and for this course of care. Thank you for your referral. If you have any questions, please contact me at Dept: [phone]. Sincerely,  Electronically signed by therapist: Edilberto Brooks    Physician's certification required:  Yes  Please co-sign or sign and return this letter via fax as soon as possible to [phone]. I certify the need for these services furnished under this plan of treatment and while under my care. X___________________________________________________ Date____________________    Certification From: 1/7/2842  To:10/30/2022            21st Century Cures Act Notice to Patient: Medical documents like this are made available to patients in the interest of transparency. However, be advised this is a medical document and it is intended as mwte-gd-[id number] communication between your medical providers. This medical document may contain abbreviations, assessments, medical data, and results or other terms that are unfamiliar. Medical documents are intended to carry relevant information, facts as evident, and the clinical opinion of the practitioner. As such, this medical document may be written in language that appears blunt or direct. You are encouraged to contact your medical provider and/or [facility] Patient Experience if you have any questions about this medical document.

## (undated) NOTE — LETTER
Date: 11/14/2022    Patient Name: Dunia Ahuja          To Whom it may concern: The above patient was seen at one of the [facility] locations for treatment of a medical condition. The patient may return to work for on 11/15/22 with the following restrictions: No use of the right upper extremity. This restriction will be in place til her follow up evaluation in 8 weeks. Further restrictions will be given at that time. Sincerely,        Joya New. Jay Wall MD  Knee, Shoulder, & Elbow Surgery / Sports Medicine Specialist  [facility]  [address], [address] [place] [address]. [email or web address]  t: [phone]  o: [phone]  f: [phone]

## (undated) NOTE — LETTER
Nery Swift [address] [address]  [place], [address] [facility]    Consent for Operation  Date: __________________                                Time: _______________    1.  I authorize the performance upon [place] the following operation:  Procedhari procedure has been videotaped, the surgeon will obtain the original videotape. The hospital will not be responsible for storage or maintenance of this tape.   7. For the purpose of advancing medical education, I consent to the admittance of observers to the STATEMENTS REQUIRING INSERTION OR COMPLETION WERE FILLED IN.     Signature of Patient:   ___________________________    When the patient is a minor or mentally incompetent to give consent:  Signature of person authorized to consent for patient: ____________ supplements, and pills I can buy without a prescription (including street drugs/illegal medications). Failure to inform my anesthesiologist about these medicines may increase my risk of anesthetic complications. iv.  If I am allergic to anything or have ha Anesthesiologist Signature     Date   Time  I have discussed the procedure and information above with the patient (or patient’s representative) and answered their questions. The patient or their representative has agreed to have anesthesia services.     ___

## (undated) NOTE — LETTER
Date: 8/31/2023    Patient Name: Alannah Troncoso          To Whom it may concern:    This letter has been written at the patient's request. The above patient was seen at the [facility] for treatment of a medical condition.    Work status: Patient can work without restrictions       Sincerely,      Fish Lepe MD  Hand, Wrist, & Elbow Surgery  [facility]  [address] [place] [address], [address] [facility], [facility]  [email or web address]  t: [phone]  f: [phone]

## (undated) NOTE — LETTER
Date & Time: 10/4/2023, 4:11 PM  Patient: Antonio Garcia  Encounter Provider(s):    JONNA Chen       To Whom It May Concern:    Moe Bustillos was seen and treated in our department on 10/4/2023. Patient will return to work on Monday if feeling better and fever free.     If you have any questions or concerns, please do not hesitate to call.        _____________________________  Physician/APC Signature

## (undated) NOTE — LETTER
Date: 7/14/2022    Patient Name: Antonio Garcia          To Whom it may concern: This letter has been written at the patient's request. The above patient was seen at the [facility] for treatment of a medical condition. This patient is unable to work. Next appointment in 4 weeks.        Sincerely,    JONNA Byrd

## (undated) NOTE — ED AVS SNAPSHOT
Domi Botello   MRN: [id number]    Department:  [facility] Emergency Department   Date of Visit:  2/19/2019           Disclosure     Insurance plans vary and the physician(s) referred by the ER may not be covered by your plan.  Please contact yo tell this physician (or your personal doctor if your instructions are to return to your personal doctor) about any new or lasting problems. The primary care or specialist physician will see patients referred from the [facility] Emergency Department.  Nora Segovia

## (undated) NOTE — LETTER
Patient Name: Elder Rubinstein  YOB: 1965          MRN :  [id number]  Date:  8/1/2022  Referring Physician:  Aditi Garcia    Progress Summary  Pt has attended 4 visits in Occupational Therapy. Subjective: \"I was pretty sore after I left here last time. \"    Pain: 0/10 at rest, however up to 10 when elbow is touched      Objective: Elbow extension/flexion prior to treatment=20/140       Assessment: Pt making gains in both elbow flexion and extension. Wrist and forearm motion has also improved. Pt remains quite sensitive at the incision however tolerating touch to surrounding areas without pain. Steristrips are gone, performed gentle scar mobilization. Encouraged pt to perform gentle desensitization by touching the scar. Pt will continue to benefit from skilled therapy to maximize functional outcomes. Goals: (to be met in 12 visits)  Pt will be independent and compliant with comprehensive HEP to maintain progress achieved in OT  Pt will increase elbow extension to at least 10-15 deg for improved reaching into cabinets  Pt will increase elbow flexion to at least 145 for self feeding  Pt will sleep at least 6 hours nightly without waking due to elbow pain  Pt will increase wrist ROM to be [facility] [place] for use while performing facial hygiene tasks  Will continue to upgrade as appropriate. Plan: Continue OT for ROM. Requesting 8 additional visits from [facility]. Requires MD order to authorize additional treatments    Patient/Family/Caregiver was advised of these findings, precautions, and treatment options and has agreed to actively participate in planning and for this course of care. Thank you for your referral. If you have any questions, please contact me at Dept: [phone]. Sincerely,  Electronically signed by therapist: Edilberto Encarnacion    Physician's certification required:  Yes  Please co-sign or sign and return this letter via fax as soon as possible to [phone].    I certify the need for these services furnished under this plan of treatment and while under my care. X___________________________________________________ Date____________________    Certification From: 7/1/4361  To:10/30/2022            21st Century Cures Act Notice to Patient: Medical documents like this are made available to patients in the interest of transparency. However, be advised this is a medical document and it is intended as imef-ks-vxbb communication between your medical providers. This medical document may contain abbreviations, assessments, medical data, and results or other terms that are unfamiliar. Medical documents are intended to carry relevant information, facts as evident, and the clinical opinion of the practitioner. As such, this medical document may be written in language that appears blunt or direct. You are encouraged to contact your medical provider and/or [facility] Patient Experience if you have any questions about this medical document.

## (undated) NOTE — LETTER
Date: 9/8/2022    Patient Name: Clive Leach          To Whom it may concern: This patient can return to work full duty with no restrictions on 9/12/2022. It should be noted that she is not normal yet and still has pain and stiffness but at this point we are comfortable with her attempting to return to work with no restrictions.       Sincerely,    JONNA Raymond

## (undated) NOTE — ED AVS SNAPSHOT
Christine Dunn   MRN: [id number]    Department:  [facility] Emergency Department   Date of Visit:  11/6/2019           Disclosure     Insurance plans vary and the physician(s) referred by the ER may not be covered by your plan.  Please contact yo tell this physician (or your personal doctor if your instructions are to return to your personal doctor) about any new or lasting problems. The primary care or specialist physician will see patients referred from the [facility] Emergency Department.  Tiffani Sotomayor

## (undated) NOTE — LETTER
2019    Return to School / Work    Name: Oswaldo Quiros        : 1965    To Whom It May Concern,    Oswaldo Quiros had surgery on 19 and is:    Able to return to school / work with restrictions:  No lifting over: 20 lbs until 1/15/2

## (undated) NOTE — LETTER
21      RE: Corrie Mendez     : 1965    Dear Dr. Neida Valentino,    This letter is to inform you that your patient is being scheduled for surgery with Dr. Orozco on 21 at [facility]. We have asked the patient to contact your office to sche

## (undated) NOTE — LETTER
19    Patient: Joe Avendano  : 1965 Visit date: 2019    Dear  Dr. Chandler Smith MD,    Thank you for referring Joe Avendano to my practice. Please find my assessment and plan below.              History of Present Illness     54-year-ol

## (undated) NOTE — LETTER
Date: 11/16/2022    Patient Name: Tre Dickson          To Whom it may concern: The above patient was seen at one of the [facility] locations for treatment of a medical condition. The patient may return to work with no restrictions. Sincerely,        Tobi Gonzales. Lilly Garibay MD  Knee, Shoulder, & Elbow Surgery / Sports Medicine Specialist  [facility]  [place] [address], [facility], [address] [facility]. [email or web address]  t: [phone]  o: [phone]  f: [phone]

## (undated) NOTE — LETTER
Date: 8/4/2022    Patient Name: Freedom Howell          To Whom it may concern: This letter has been written at the patient's request. The above patient was seen at the [facility] for treatment of a medical condition. The patient can return to work on 8/15/2022 with the following restrictions: Less than 2 pounds.       Sincerely,    JONNA Batista

## (undated) NOTE — LETTER
Date: 7/14/2022    Patient Name: Clive Leach          To Whom it may concern: This letter has been written at the patient's request. The above patient was seen at the [facility] for treatment of a medical condition. This patient is unable to work. Next appointment in 4 weeks.        Sincerely,    JONNA Raymond

## (undated) NOTE — LETTER
Date: 3/6/2024    Patient Name: Alannah Troncoso      To Whom it may concern:    The above patient was seen at [facility] for treatment of a medical condition.    Patient should be off work on 3/4/24-3/11/24 when she will be re-evaluated in office. She should be able to return to work without restrictions on 3/12/24 but this depends on her pain and recovery.      Sincerely,        Samantha Sánchez PA-C  Hand, Wrist, & Elbow Surgery  Physician Assistant to Dr. Fish Lepe  [facility]  [address] [place] [address], [address], [email or web address]  [email or web address]  t: [phone]  f: [phone]

## (undated) NOTE — LETTER
Date: 4/18/2023    Patient Name: Frances Lieberman          To Whom it may concern: The above patient was seen via telemedicine at the [facility] for treatment of a medical condition. This patient should be excused from attending work/school from Tuesday April 18, 2023 through Thursday April 20, 2023. The patient may return to work/school per CDC/school policy.       Sincerely,    [facility] [employer] DO Ray

## (undated) NOTE — LETTER
Date: 2/9/2023    Patient Name: Gabe Chow          To Whom it may concern: The above patient was seen at the [facility] for treatment of a medical condition. Patient can continue to work without restrictions. We will reassess patient at the 1 year christian at which point she will likely be at [facility] [address] [place] [address]. Sincerely,      Rachel Mendez MD  Hand, Wrist, & Elbow Surgery  [facility]  [facility] [address], [address] [facility], Susanna Coles[address]  [email or web address]  t: [phone]  f: [phone]

## (undated) NOTE — LETTER
Date: 4/22/2024    Patient Name: Alannah Troncoso          To Whom it may concern:    The above patient was seen at [facility] for treatment of a medical condition.    This patient can continue working full duty and is at [facility].     Sincerely,    JONNA Polanco

## (undated) NOTE — LETTER
Date: 3/11/2024    Patient Name: Alannah Troncoso          To Whom it may concern:    The above patient was seen at [facility] for treatment of a medical condition.    This patient will return to work full duty on 3/15/24.      Sincerely,    JONNA Polanco

## (undated) NOTE — LETTER
Date: 10/27/2022    Patient Name: Bridger Maldonado      To Whom it may concern: This letter has been written at the patient's request. The above patient was seen at the [facility] for treatment of a medical condition. Patient can continue to work without restrictions. Sincerely,    Jitendra Reyes MD  Hand, Wrist, & Elbow Surgery  [facility]  [facility] [address], [address] [facility], [address] [place] [address]  [email or web address]  t: [phone]  f: [phone]

## (undated) NOTE — LETTER
Date & Time: 1/18/2023, 2:08 PM  Patient: Elmo Krause  Encounter Provider(s):    Vel Walls, DO         This certifies that [employer], a patient at an [facility], am leaving the facility voluntarily and against the advice of my physician. I acknowledge that I have been:    1. informed that my physician believes that I need to receive care here;  2. informed that if I leave, I could become sicker or even die; and  3. provided discharge instructions consistent with my current diagnosis. I hereby release my physician, the facility, and its employees from all responsibility for any ill effects which may result from this action. __________________________________  Patient or authorized caregiver signature    __________________________________  RN signature    If no patient or patient representative signature was obtained, sign below to acknowledge that the form was reviewed with the patient and that the patient refused to sign.     __________________________________  RN signature